# Patient Record
Sex: FEMALE | Race: WHITE | Employment: OTHER | URBAN - METROPOLITAN AREA
[De-identification: names, ages, dates, MRNs, and addresses within clinical notes are randomized per-mention and may not be internally consistent; named-entity substitution may affect disease eponyms.]

---

## 2017-01-12 ENCOUNTER — TRANSFERRED RECORDS (OUTPATIENT)
Dept: HEALTH INFORMATION MANAGEMENT | Facility: CLINIC | Age: 72
End: 2017-01-12

## 2017-01-12 DIAGNOSIS — M25.561 RIGHT KNEE PAIN: Primary | ICD-10-CM

## 2017-01-12 LAB
CRP SERPL-MCNC: <2.9 MG/L (ref 0–8)
ERYTHROCYTE [SEDIMENTATION RATE] IN BLOOD BY WESTERGREN METHOD: 6 MM/H (ref 0–30)

## 2017-01-12 PROCEDURE — 36415 COLL VENOUS BLD VENIPUNCTURE: CPT | Performed by: FAMILY MEDICINE

## 2017-01-12 PROCEDURE — 86140 C-REACTIVE PROTEIN: CPT | Performed by: FAMILY MEDICINE

## 2017-01-12 PROCEDURE — 85652 RBC SED RATE AUTOMATED: CPT | Performed by: FAMILY MEDICINE

## 2017-04-28 ENCOUNTER — HOSPITAL ENCOUNTER (OUTPATIENT)
Dept: NUCLEAR MEDICINE | Facility: CLINIC | Age: 72
Setting detail: NUCLEAR MEDICINE
Discharge: HOME | End: 2017-04-28
Attending: ORTHOPAEDIC SURGERY | Admitting: ORTHOPAEDIC SURGERY
Payer: OTHER MISCELLANEOUS

## 2017-04-28 ENCOUNTER — HOSPITAL ENCOUNTER (OUTPATIENT)
Dept: NUCLEAR MEDICINE | Facility: CLINIC | Age: 72
Setting detail: NUCLEAR MEDICINE
End: 2017-04-28
Attending: ORTHOPAEDIC SURGERY
Payer: OTHER MISCELLANEOUS

## 2017-04-28 DIAGNOSIS — M25.561 KNEE PAIN, RIGHT: ICD-10-CM

## 2017-04-28 PROCEDURE — 34300033 ZZH RX 343: Performed by: ORTHOPAEDIC SURGERY

## 2017-04-28 PROCEDURE — 78315 BONE IMAGING 3 PHASE: CPT

## 2017-04-28 PROCEDURE — A9561 TC99M OXIDRONATE: HCPCS | Performed by: ORTHOPAEDIC SURGERY

## 2017-04-28 RX ADMIN — Medication 26.5 MILLICURIE: at 08:04

## 2017-06-16 DIAGNOSIS — E11.9 TYPE 2 DIABETES MELLITUS WITHOUT COMPLICATION, UNSPECIFIED LONG TERM INSULIN USE STATUS: ICD-10-CM

## 2017-06-16 RX ORDER — METFORMIN HCL 500 MG
TABLET, EXTENDED RELEASE 24 HR ORAL
Qty: 60 TABLET | Refills: 0 | Status: SHIPPED | OUTPATIENT
Start: 2017-06-16 | End: 2017-07-30

## 2017-06-16 NOTE — LETTER
[facility]  [address]  [facility] [address]  Phone: [phone]    June 16, 2017    Frances Cates                                                                                                                 [address] [facility] [address]            Dear Ms. Loan,    We are concerned about your health care.  We recently provided you with a medication refill.  Many medications require routine follow-up with your Doctor.      At this time we ask that: You make an appointment at your clinic for routine labs for medication monitoring.     Per 12- Hemoglobin A1c result note:     Notes Recorded by Violet Beauchamp MD on 12/16/2016 at 2:56 PM  Notify patient her basic chemistries and kidney functions are fine, and her cholesterol levels are very good.  The blood sugar was high at 295 at this time, but we know her A1C had gone way up too. She is starting the Dumas Valentino diet and we doubled her metformin, so she will need to have us recheck the A1C in March and see if this is enough of a change or if we need to add a second medication.  I have renewed her blood pressure meds and placed a future order for the A1C.      Your prescription: Has been refilled for 1 month so you may have time for the above noted follow-up.      Thank you,      Violet Beauchamp MD/ [facility]

## 2017-07-30 DIAGNOSIS — E11.9 TYPE 2 DIABETES MELLITUS WITHOUT COMPLICATION, UNSPECIFIED LONG TERM INSULIN USE STATUS: ICD-10-CM

## 2017-07-31 RX ORDER — METFORMIN HCL 500 MG
1000 TABLET, EXTENDED RELEASE 24 HR ORAL 2 TIMES DAILY WITH MEALS
Qty: 120 TABLET | Refills: 0 | Status: SHIPPED | OUTPATIENT
Start: 2017-07-31

## 2017-08-08 ENCOUNTER — TELEPHONE (OUTPATIENT)
Dept: FAMILY MEDICINE | Facility: CLINIC | Age: 72
End: 2017-08-08

## 2017-08-08 NOTE — TELEPHONE ENCOUNTER
RN hypertension panel management  Offer free BP CK with the RN  Patient has switched to [facility] for a clinic  Umm GARCIA RN

## 2017-11-06 ENCOUNTER — TRANSFERRED RECORDS (OUTPATIENT)
Dept: HEALTH INFORMATION MANAGEMENT | Facility: CLINIC | Age: 72
End: 2017-11-06

## 2017-11-21 ENCOUNTER — ANESTHESIA EVENT (OUTPATIENT)
Dept: SURGERY | Facility: CLINIC | Age: 72
DRG: 468 | End: 2017-11-21
Payer: OTHER MISCELLANEOUS

## 2017-11-22 ENCOUNTER — APPOINTMENT (OUTPATIENT)
Dept: GENERAL RADIOLOGY | Facility: CLINIC | Age: 72
DRG: 468 | End: 2017-11-22
Attending: ORTHOPAEDIC SURGERY
Payer: OTHER MISCELLANEOUS

## 2017-11-22 ENCOUNTER — SURGERY (OUTPATIENT)
Age: 72
End: 2017-11-22

## 2017-11-22 ENCOUNTER — ANESTHESIA (OUTPATIENT)
Dept: SURGERY | Facility: CLINIC | Age: 72
DRG: 468 | End: 2017-11-22
Payer: OTHER MISCELLANEOUS

## 2017-11-22 ENCOUNTER — HOSPITAL ENCOUNTER (INPATIENT)
Facility: CLINIC | Age: 72
LOS: 2 days | Discharge: HOME | DRG: 468 | End: 2017-11-24
Attending: ORTHOPAEDIC SURGERY | Admitting: ORTHOPAEDIC SURGERY
Payer: OTHER MISCELLANEOUS

## 2017-11-22 DIAGNOSIS — B36.9 FUNGAL RASH OF TRUNK: ICD-10-CM

## 2017-11-22 DIAGNOSIS — Z96.651 STATUS POST TOTAL RIGHT KNEE REPLACEMENT: Primary | ICD-10-CM

## 2017-11-22 DIAGNOSIS — E11.9 TYPE II OR UNSPECIFIED TYPE DIABETES MELLITUS WITHOUT MENTION OF COMPLICATION, NOT STATED AS UNCONTROLLED: ICD-10-CM

## 2017-11-22 PROBLEM — Z96.659 S/P TOTAL KNEE ARTHROPLASTY: Status: ACTIVE | Noted: 2017-11-22

## 2017-11-22 LAB
GLUCOSE BLDC GLUCOMTR-MCNC: 171 MG/DL (ref 70–99)
GLUCOSE BLDC GLUCOMTR-MCNC: 211 MG/DL (ref 70–99)
GLUCOSE BLDC GLUCOMTR-MCNC: 219 MG/DL (ref 70–99)
GLUCOSE BLDC GLUCOMTR-MCNC: 225 MG/DL (ref 70–99)

## 2017-11-22 PROCEDURE — 25000128 H RX IP 250 OP 636: Performed by: NURSE ANESTHETIST, CERTIFIED REGISTERED

## 2017-11-22 PROCEDURE — 25000131 ZZH RX MED GY IP 250 OP 636 PS 637: Performed by: PHYSICIAN ASSISTANT

## 2017-11-22 PROCEDURE — 40000306 ZZH STATISTIC PRE PROC ASSESS II: Performed by: ORTHOPAEDIC SURGERY

## 2017-11-22 PROCEDURE — 37000009 ZZH ANESTHESIA TECHNICAL FEE, EACH ADDTL 15 MIN: Performed by: ORTHOPAEDIC SURGERY

## 2017-11-22 PROCEDURE — 25000125 ZZHC RX 250: Performed by: NURSE ANESTHETIST, CERTIFIED REGISTERED

## 2017-11-22 PROCEDURE — 36000067 ZZH SURGERY LEVEL 5 1ST 30 MIN: Performed by: ORTHOPAEDIC SURGERY

## 2017-11-22 PROCEDURE — 0SRC0JA REPLACEMENT OF RIGHT KNEE JOINT WITH SYNTHETIC SUBSTITUTE, UNCEMENTED, OPEN APPROACH: ICD-10-PCS | Performed by: ORTHOPAEDIC SURGERY

## 2017-11-22 PROCEDURE — 27210794 ZZH OR GENERAL SUPPLY STERILE: Performed by: ORTHOPAEDIC SURGERY

## 2017-11-22 PROCEDURE — 71000012 ZZH RECOVERY PHASE 1 LEVEL 1 FIRST HR: Performed by: ORTHOPAEDIC SURGERY

## 2017-11-22 PROCEDURE — 36000069 ZZH SURGERY LEVEL 5 EA 15 ADDTL MIN: Performed by: ORTHOPAEDIC SURGERY

## 2017-11-22 PROCEDURE — 71000013 ZZH RECOVERY PHASE 1 LEVEL 1 EA ADDTL HR: Performed by: ORTHOPAEDIC SURGERY

## 2017-11-22 PROCEDURE — 12000007 ZZH R&B INTERMEDIATE

## 2017-11-22 PROCEDURE — 25000125 ZZHC RX 250: Performed by: PHYSICIAN ASSISTANT

## 2017-11-22 PROCEDURE — 0SPC0JZ REMOVAL OF SYNTHETIC SUBSTITUTE FROM RIGHT KNEE JOINT, OPEN APPROACH: ICD-10-PCS | Performed by: ORTHOPAEDIC SURGERY

## 2017-11-22 PROCEDURE — 25800025 ZZH RX 258: Performed by: ORTHOPAEDIC SURGERY

## 2017-11-22 PROCEDURE — C1776 JOINT DEVICE (IMPLANTABLE): HCPCS | Performed by: ORTHOPAEDIC SURGERY

## 2017-11-22 PROCEDURE — 27810169 ZZH OR IMPLANT GENERAL: Performed by: ORTHOPAEDIC SURGERY

## 2017-11-22 PROCEDURE — 25000128 H RX IP 250 OP 636: Performed by: ORTHOPAEDIC SURGERY

## 2017-11-22 PROCEDURE — 25000125 ZZHC RX 250: Performed by: ORTHOPAEDIC SURGERY

## 2017-11-22 PROCEDURE — 27210995 ZZH RX 272: Performed by: ORTHOPAEDIC SURGERY

## 2017-11-22 PROCEDURE — 25000132 ZZH RX MED GY IP 250 OP 250 PS 637: Performed by: ORTHOPAEDIC SURGERY

## 2017-11-22 PROCEDURE — 37000008 ZZH ANESTHESIA TECHNICAL FEE, 1ST 30 MIN: Performed by: ORTHOPAEDIC SURGERY

## 2017-11-22 PROCEDURE — 25000128 H RX IP 250 OP 636: Performed by: PHYSICIAN ASSISTANT

## 2017-11-22 PROCEDURE — 40000986 XR KNEE PORT RT 1/2 VW: Mod: RT

## 2017-11-22 PROCEDURE — 0SBC0ZZ EXCISION OF RIGHT KNEE JOINT, OPEN APPROACH: ICD-10-PCS | Performed by: ORTHOPAEDIC SURGERY

## 2017-11-22 PROCEDURE — 00000146 ZZHCL STATISTIC GLUCOSE BY METER IP

## 2017-11-22 DEVICE — IMPLANTABLE DEVICE: Type: IMPLANTABLE DEVICE | Site: KNEE | Status: FUNCTIONAL

## 2017-11-22 DEVICE — BONE CEMENT PALACOS 00-1112-140-01: Type: IMPLANTABLE DEVICE | Site: KNEE | Status: FUNCTIONAL

## 2017-11-22 RX ORDER — HYDROCHLOROTHIAZIDE 12.5 MG/1
25 CAPSULE ORAL DAILY
Status: DISCONTINUED | OUTPATIENT
Start: 2017-11-23 | End: 2017-11-24 | Stop reason: HOSPADM

## 2017-11-22 RX ORDER — CEFAZOLIN SODIUM 2 G/100ML
2 INJECTION, SOLUTION INTRAVENOUS EVERY 8 HOURS
Status: COMPLETED | OUTPATIENT
Start: 2017-11-22 | End: 2017-11-23

## 2017-11-22 RX ORDER — FENTANYL CITRATE 50 UG/ML
25-50 INJECTION, SOLUTION INTRAMUSCULAR; INTRAVENOUS
Status: DISCONTINUED | OUTPATIENT
Start: 2017-11-22 | End: 2017-11-24 | Stop reason: HOSPADM

## 2017-11-22 RX ORDER — METOCLOPRAMIDE 5 MG/1
5 TABLET ORAL EVERY 6 HOURS PRN
Status: DISCONTINUED | OUTPATIENT
Start: 2017-11-22 | End: 2017-11-24 | Stop reason: HOSPADM

## 2017-11-22 RX ORDER — ONDANSETRON 2 MG/ML
INJECTION INTRAMUSCULAR; INTRAVENOUS PRN
Status: DISCONTINUED | OUTPATIENT
Start: 2017-11-22 | End: 2017-11-22

## 2017-11-22 RX ORDER — LIDOCAINE 40 MG/G
CREAM TOPICAL
Status: DISCONTINUED | OUTPATIENT
Start: 2017-11-22 | End: 2017-11-22 | Stop reason: HOSPADM

## 2017-11-22 RX ORDER — ALBUTEROL SULFATE 0.83 MG/ML
2.5 SOLUTION RESPIRATORY (INHALATION) EVERY 4 HOURS PRN
Status: DISCONTINUED | OUTPATIENT
Start: 2017-11-22 | End: 2017-11-22 | Stop reason: HOSPADM

## 2017-11-22 RX ORDER — LABETALOL HYDROCHLORIDE 5 MG/ML
20 INJECTION, SOLUTION INTRAVENOUS EVERY 6 HOURS PRN
Status: DISCONTINUED | OUTPATIENT
Start: 2017-11-22 | End: 2017-11-24 | Stop reason: HOSPADM

## 2017-11-22 RX ORDER — ONDANSETRON 2 MG/ML
4 INJECTION INTRAMUSCULAR; INTRAVENOUS EVERY 30 MIN PRN
Status: DISCONTINUED | OUTPATIENT
Start: 2017-11-22 | End: 2017-11-24 | Stop reason: HOSPADM

## 2017-11-22 RX ORDER — LIDOCAINE 40 MG/G
CREAM TOPICAL
Status: DISCONTINUED | OUTPATIENT
Start: 2017-11-22 | End: 2017-11-24 | Stop reason: HOSPADM

## 2017-11-22 RX ORDER — FENTANYL CITRATE 50 UG/ML
25-50 INJECTION, SOLUTION INTRAMUSCULAR; INTRAVENOUS
Status: DISCONTINUED | OUTPATIENT
Start: 2017-11-22 | End: 2017-11-22 | Stop reason: HOSPADM

## 2017-11-22 RX ORDER — ROPIVACAINE HYDROCHLORIDE 5 MG/ML
INJECTION, SOLUTION EPIDURAL; INFILTRATION; PERINEURAL PRN
Status: DISCONTINUED | OUTPATIENT
Start: 2017-11-22 | End: 2017-11-22

## 2017-11-22 RX ORDER — DEXAMETHASONE SODIUM PHOSPHATE 4 MG/ML
INJECTION, SOLUTION INTRA-ARTICULAR; INTRALESIONAL; INTRAMUSCULAR; INTRAVENOUS; SOFT TISSUE PRN
Status: DISCONTINUED | OUTPATIENT
Start: 2017-11-22 | End: 2017-11-22

## 2017-11-22 RX ORDER — SODIUM CHLORIDE, SODIUM LACTATE, POTASSIUM CHLORIDE, CALCIUM CHLORIDE 600; 310; 30; 20 MG/100ML; MG/100ML; MG/100ML; MG/100ML
INJECTION, SOLUTION INTRAVENOUS CONTINUOUS
Status: DISCONTINUED | OUTPATIENT
Start: 2017-11-22 | End: 2017-11-22

## 2017-11-22 RX ORDER — HYDROMORPHONE HYDROCHLORIDE 1 MG/ML
.3-.5 INJECTION, SOLUTION INTRAMUSCULAR; INTRAVENOUS; SUBCUTANEOUS EVERY 10 MIN PRN
Status: DISCONTINUED | OUTPATIENT
Start: 2017-11-22 | End: 2017-11-22

## 2017-11-22 RX ORDER — ONDANSETRON 4 MG/1
4 TABLET, ORALLY DISINTEGRATING ORAL EVERY 30 MIN PRN
Status: DISCONTINUED | OUTPATIENT
Start: 2017-11-22 | End: 2017-11-24 | Stop reason: HOSPADM

## 2017-11-22 RX ORDER — HYDROXYZINE HYDROCHLORIDE 25 MG/1
25 TABLET, FILM COATED ORAL EVERY 6 HOURS PRN
Status: DISCONTINUED | OUTPATIENT
Start: 2017-11-22 | End: 2017-11-24 | Stop reason: HOSPADM

## 2017-11-22 RX ORDER — OXYCODONE HYDROCHLORIDE 5 MG/1
5-10 TABLET ORAL
Status: DISCONTINUED | OUTPATIENT
Start: 2017-11-22 | End: 2017-11-24 | Stop reason: HOSPADM

## 2017-11-22 RX ORDER — SODIUM CHLORIDE, SODIUM LACTATE, POTASSIUM CHLORIDE, CALCIUM CHLORIDE 600; 310; 30; 20 MG/100ML; MG/100ML; MG/100ML; MG/100ML
INJECTION, SOLUTION INTRAVENOUS CONTINUOUS
Status: DISCONTINUED | OUTPATIENT
Start: 2017-11-22 | End: 2017-11-22 | Stop reason: HOSPADM

## 2017-11-22 RX ORDER — PROPOFOL 10 MG/ML
INJECTION, EMULSION INTRAVENOUS CONTINUOUS PRN
Status: DISCONTINUED | OUTPATIENT
Start: 2017-11-22 | End: 2017-11-22

## 2017-11-22 RX ORDER — LOSARTAN POTASSIUM AND HYDROCHLOROTHIAZIDE 25; 100 MG/1; MG/1
1 TABLET ORAL DAILY
Status: DISCONTINUED | OUTPATIENT
Start: 2017-11-22 | End: 2017-11-22 | Stop reason: CLARIF

## 2017-11-22 RX ORDER — LOSARTAN POTASSIUM AND HYDROCHLOROTHIAZIDE 25; 100 MG/1; MG/1
1 TABLET ORAL DAILY
Status: DISCONTINUED | OUTPATIENT
Start: 2017-11-23 | End: 2017-11-22

## 2017-11-22 RX ORDER — GLIPIZIDE 10 MG/1
10 TABLET ORAL
Status: DISCONTINUED | OUTPATIENT
Start: 2017-11-24 | End: 2017-11-22

## 2017-11-22 RX ORDER — LIDOCAINE HYDROCHLORIDE 10 MG/ML
INJECTION, SOLUTION EPIDURAL; INFILTRATION; INTRACAUDAL; PERINEURAL PRN
Status: DISCONTINUED | OUTPATIENT
Start: 2017-11-22 | End: 2017-11-22

## 2017-11-22 RX ORDER — GLIPIZIDE 5 MG/1
5 TABLET ORAL
Status: DISCONTINUED | OUTPATIENT
Start: 2017-11-24 | End: 2017-11-24 | Stop reason: HOSPADM

## 2017-11-22 RX ORDER — HYDROMORPHONE HYDROCHLORIDE 1 MG/ML
.3-.5 INJECTION, SOLUTION INTRAMUSCULAR; INTRAVENOUS; SUBCUTANEOUS
Status: DISCONTINUED | OUTPATIENT
Start: 2017-11-22 | End: 2017-11-24 | Stop reason: HOSPADM

## 2017-11-22 RX ORDER — METFORMIN HCL 500 MG
1000 TABLET, EXTENDED RELEASE 24 HR ORAL 2 TIMES DAILY WITH MEALS
Status: DISCONTINUED | OUTPATIENT
Start: 2017-11-24 | End: 2017-11-24 | Stop reason: HOSPADM

## 2017-11-22 RX ORDER — BUPIVACAINE HYDROCHLORIDE 7.5 MG/ML
INJECTION, SOLUTION INTRASPINAL PRN
Status: DISCONTINUED | OUTPATIENT
Start: 2017-11-22 | End: 2017-11-22

## 2017-11-22 RX ORDER — EPINEPHRINE 1 MG/ML
INJECTION, SOLUTION, CONCENTRATE INTRAVENOUS PRN
Status: DISCONTINUED | OUTPATIENT
Start: 2017-11-22 | End: 2017-11-22

## 2017-11-22 RX ORDER — MEPERIDINE HYDROCHLORIDE 25 MG/ML
12.5 INJECTION INTRAMUSCULAR; INTRAVENOUS; SUBCUTANEOUS
Status: DISCONTINUED | OUTPATIENT
Start: 2017-11-22 | End: 2017-11-22

## 2017-11-22 RX ORDER — CEFAZOLIN SODIUM 2 G/100ML
2 INJECTION, SOLUTION INTRAVENOUS
Status: COMPLETED | OUTPATIENT
Start: 2017-11-22 | End: 2017-11-22

## 2017-11-22 RX ORDER — LOSARTAN POTASSIUM 50 MG/1
100 TABLET ORAL DAILY
Status: DISCONTINUED | OUTPATIENT
Start: 2017-11-23 | End: 2017-11-24 | Stop reason: HOSPADM

## 2017-11-22 RX ORDER — ONDANSETRON 4 MG/1
4 TABLET, ORALLY DISINTEGRATING ORAL EVERY 6 HOURS PRN
Status: DISCONTINUED | OUTPATIENT
Start: 2017-11-22 | End: 2017-11-24 | Stop reason: HOSPADM

## 2017-11-22 RX ORDER — DEXAMETHASONE SODIUM PHOSPHATE 10 MG/ML
10 INJECTION, SOLUTION INTRAMUSCULAR; INTRAVENOUS
Status: DISCONTINUED | OUTPATIENT
Start: 2017-11-22 | End: 2017-11-22 | Stop reason: HOSPADM

## 2017-11-22 RX ORDER — ONDANSETRON 2 MG/ML
4 INJECTION INTRAMUSCULAR; INTRAVENOUS EVERY 6 HOURS PRN
Status: DISCONTINUED | OUTPATIENT
Start: 2017-11-22 | End: 2017-11-24 | Stop reason: HOSPADM

## 2017-11-22 RX ORDER — METOCLOPRAMIDE HYDROCHLORIDE 5 MG/ML
5 INJECTION INTRAMUSCULAR; INTRAVENOUS EVERY 6 HOURS PRN
Status: DISCONTINUED | OUTPATIENT
Start: 2017-11-22 | End: 2017-11-24 | Stop reason: HOSPADM

## 2017-11-22 RX ORDER — FENTANYL CITRATE 50 UG/ML
INJECTION, SOLUTION INTRAMUSCULAR; INTRAVENOUS PRN
Status: DISCONTINUED | OUTPATIENT
Start: 2017-11-22 | End: 2017-11-22

## 2017-11-22 RX ORDER — AMLODIPINE BESYLATE 10 MG/1
10 TABLET ORAL DAILY
Status: DISCONTINUED | OUTPATIENT
Start: 2017-11-23 | End: 2017-11-24 | Stop reason: HOSPADM

## 2017-11-22 RX ORDER — CYCLOBENZAPRINE HCL 5 MG
5 TABLET ORAL 3 TIMES DAILY PRN
Status: DISCONTINUED | OUTPATIENT
Start: 2017-11-22 | End: 2017-11-24 | Stop reason: HOSPADM

## 2017-11-22 RX ORDER — NALOXONE HYDROCHLORIDE 0.4 MG/ML
.1-.4 INJECTION, SOLUTION INTRAMUSCULAR; INTRAVENOUS; SUBCUTANEOUS
Status: DISCONTINUED | OUTPATIENT
Start: 2017-11-22 | End: 2017-11-24 | Stop reason: HOSPADM

## 2017-11-22 RX ORDER — ACETAMINOPHEN 325 MG/1
650 TABLET ORAL EVERY 4 HOURS PRN
Status: DISCONTINUED | OUTPATIENT
Start: 2017-11-25 | End: 2017-11-24 | Stop reason: HOSPADM

## 2017-11-22 RX ORDER — LIDOCAINE HYDROCHLORIDE 10 MG/ML
INJECTION, SOLUTION INFILTRATION; PERINEURAL PRN
Status: DISCONTINUED | OUTPATIENT
Start: 2017-11-22 | End: 2017-11-22

## 2017-11-22 RX ORDER — DEXTROSE MONOHYDRATE 25 G/50ML
25-50 INJECTION, SOLUTION INTRAVENOUS
Status: DISCONTINUED | OUTPATIENT
Start: 2017-11-22 | End: 2017-11-24 | Stop reason: HOSPADM

## 2017-11-22 RX ORDER — AMOXICILLIN 250 MG
1-2 CAPSULE ORAL 2 TIMES DAILY
Status: DISCONTINUED | OUTPATIENT
Start: 2017-11-22 | End: 2017-11-24 | Stop reason: HOSPADM

## 2017-11-22 RX ORDER — NICOTINE POLACRILEX 4 MG
15-30 LOZENGE BUCCAL
Status: DISCONTINUED | OUTPATIENT
Start: 2017-11-22 | End: 2017-11-24 | Stop reason: HOSPADM

## 2017-11-22 RX ORDER — ACETAMINOPHEN 325 MG/1
975 TABLET ORAL EVERY 8 HOURS
Status: DISCONTINUED | OUTPATIENT
Start: 2017-11-22 | End: 2017-11-24 | Stop reason: HOSPADM

## 2017-11-22 RX ORDER — NALOXONE HYDROCHLORIDE 0.4 MG/ML
.1-.4 INJECTION, SOLUTION INTRAMUSCULAR; INTRAVENOUS; SUBCUTANEOUS
Status: ACTIVE | OUTPATIENT
Start: 2017-11-22 | End: 2017-11-23

## 2017-11-22 RX ORDER — SODIUM CHLORIDE, SODIUM LACTATE, POTASSIUM CHLORIDE, CALCIUM CHLORIDE 600; 310; 30; 20 MG/100ML; MG/100ML; MG/100ML; MG/100ML
INJECTION, SOLUTION INTRAVENOUS CONTINUOUS
Status: DISCONTINUED | OUTPATIENT
Start: 2017-11-22 | End: 2017-11-23

## 2017-11-22 RX ADMIN — DEXAMETHASONE SODIUM PHOSPHATE 10 MG: 4 INJECTION, SOLUTION INTRA-ARTICULAR; INTRALESIONAL; INTRAMUSCULAR; INTRAVENOUS; SOFT TISSUE at 09:53

## 2017-11-22 RX ADMIN — ACETAMINOPHEN 975 MG: 325 TABLET, FILM COATED ORAL at 22:30

## 2017-11-22 RX ADMIN — TRANEXAMIC ACID 1 G: 100 INJECTION, SOLUTION INTRAVENOUS at 09:56

## 2017-11-22 RX ADMIN — ROPIVACAINE HYDROCHLORIDE 20 ML: 5 INJECTION, SOLUTION EPIDURAL; INFILTRATION; PERINEURAL at 13:27

## 2017-11-22 RX ADMIN — CYCLOBENZAPRINE 5 MG: 5 TABLET, FILM COATED ORAL at 17:54

## 2017-11-22 RX ADMIN — EPINEPHRINE 0.2 MG: 1 INJECTION, SOLUTION INTRAMUSCULAR; SUBCUTANEOUS at 09:48

## 2017-11-22 RX ADMIN — BUPIVACAINE HYDROCHLORIDE IN DEXTROSE 1.6 ML: 7.5 INJECTION, SOLUTION SUBARACHNOID at 09:48

## 2017-11-22 RX ADMIN — MIDAZOLAM HYDROCHLORIDE 2 MG: 1 INJECTION, SOLUTION INTRAMUSCULAR; INTRAVENOUS at 12:09

## 2017-11-22 RX ADMIN — SODIUM CHLORIDE, POTASSIUM CHLORIDE, SODIUM LACTATE AND CALCIUM CHLORIDE: 600; 310; 30; 20 INJECTION, SOLUTION INTRAVENOUS at 23:56

## 2017-11-22 RX ADMIN — OXYCODONE HYDROCHLORIDE 5 MG: 5 TABLET ORAL at 17:54

## 2017-11-22 RX ADMIN — LIDOCAINE HYDROCHLORIDE 3 ML: 10 INJECTION, SOLUTION EPIDURAL; INFILTRATION; INTRACAUDAL; PERINEURAL at 13:25

## 2017-11-22 RX ADMIN — SODIUM CHLORIDE, POTASSIUM CHLORIDE, SODIUM LACTATE AND CALCIUM CHLORIDE: 600; 310; 30; 20 INJECTION, SOLUTION INTRAVENOUS at 08:37

## 2017-11-22 RX ADMIN — FENTANYL CITRATE 50 MCG: 50 INJECTION INTRAMUSCULAR; INTRAVENOUS at 13:25

## 2017-11-22 RX ADMIN — FENTANYL CITRATE 50 MCG: 50 INJECTION, SOLUTION INTRAMUSCULAR; INTRAVENOUS at 09:39

## 2017-11-22 RX ADMIN — OXYCODONE HYDROCHLORIDE 5 MG: 5 TABLET ORAL at 14:56

## 2017-11-22 RX ADMIN — CEFAZOLIN SODIUM 2 G: 2 INJECTION, SOLUTION INTRAVENOUS at 17:54

## 2017-11-22 RX ADMIN — MIDAZOLAM HYDROCHLORIDE 1 MG: 1 INJECTION, SOLUTION INTRAMUSCULAR; INTRAVENOUS at 09:39

## 2017-11-22 RX ADMIN — FENTANYL CITRATE 50 MCG: 50 INJECTION INTRAMUSCULAR; INTRAVENOUS at 13:33

## 2017-11-22 RX ADMIN — MIDAZOLAM HYDROCHLORIDE 1 MG: 1 INJECTION, SOLUTION INTRAMUSCULAR; INTRAVENOUS at 09:42

## 2017-11-22 RX ADMIN — OXYCODONE HYDROCHLORIDE 10 MG: 5 TABLET ORAL at 20:46

## 2017-11-22 RX ADMIN — LIDOCAINE HYDROCHLORIDE 5 ML: 10 INJECTION, SOLUTION INFILTRATION; PERINEURAL at 09:53

## 2017-11-22 RX ADMIN — HYDROMORPHONE HYDROCHLORIDE 1 MG: 1 INJECTION, SOLUTION INTRAMUSCULAR; INTRAVENOUS; SUBCUTANEOUS at 12:42

## 2017-11-22 RX ADMIN — LIDOCAINE HYDROCHLORIDE 1 ML: 10 INJECTION, SOLUTION EPIDURAL; INFILTRATION; INTRACAUDAL; PERINEURAL at 08:37

## 2017-11-22 RX ADMIN — ACETAMINOPHEN 975 MG: 325 TABLET, FILM COATED ORAL at 14:56

## 2017-11-22 RX ADMIN — SODIUM CHLORIDE, POTASSIUM CHLORIDE, SODIUM LACTATE AND CALCIUM CHLORIDE: 600; 310; 30; 20 INJECTION, SOLUTION INTRAVENOUS at 10:30

## 2017-11-22 RX ADMIN — CEFAZOLIN SODIUM 2 G: 2 INJECTION, SOLUTION INTRAVENOUS at 09:39

## 2017-11-22 RX ADMIN — INSULIN ASPART 2 UNITS: 100 INJECTION, SOLUTION INTRAVENOUS; SUBCUTANEOUS at 17:54

## 2017-11-22 RX ADMIN — SODIUM CHLORIDE 400 ML: 900 IRRIGANT IRRIGATION at 12:04

## 2017-11-22 RX ADMIN — ONDANSETRON 4 MG: 2 INJECTION INTRAMUSCULAR; INTRAVENOUS at 09:53

## 2017-11-22 RX ADMIN — FENTANYL CITRATE 50 MCG: 50 INJECTION INTRAMUSCULAR; INTRAVENOUS at 13:05

## 2017-11-22 RX ADMIN — PROPOFOL 50 MCG/KG/MIN: 10 INJECTION, EMULSION INTRAVENOUS at 09:53

## 2017-11-22 RX ADMIN — SENNOSIDES AND DOCUSATE SODIUM 1 TABLET: 8.6; 5 TABLET ORAL at 20:46

## 2017-11-22 RX ADMIN — TRANEXAMIC ACID 1 G: 100 INJECTION, SOLUTION INTRAVENOUS at 12:00

## 2017-11-22 RX ADMIN — FENTANYL CITRATE 50 MCG: 50 INJECTION INTRAMUSCULAR; INTRAVENOUS at 13:08

## 2017-11-22 RX ADMIN — FENTANYL CITRATE 50 MCG: 50 INJECTION, SOLUTION INTRAMUSCULAR; INTRAVENOUS at 09:50

## 2017-11-22 RX ADMIN — POVIDONE-IODINE 100 ML GIVEN: 10 SOLUTION TOPICAL at 12:04

## 2017-11-22 NOTE — OR NURSING
Reddened area  along skin folds noted on low abd. No area of skin breakdown noted. Info,.given to Laura MOON, along with report.

## 2017-11-22 NOTE — BRIEF OP NOTE
Brief Ortho Op Note    Preop Diagnosis:   1. Aseptic loosening right TKA with osteolysis  Post Op Diagnosis: Same  Procedure:   1. Revision R TKA  Surgeon: Scottie  Assistant: Jose Guadalupe  Anesthesia: Spinal  EBL: 50cc  Implants: [employer] revision with 75mm x 14mm tibial stem with 37mm metaphyseal sleeve and 2.5mm revision MBT tray, 12.5mm x 3mm RP polyethylene, 75mmx 14mm femoral stem with 31mm metaphyseal sleeve, size 3 femoral component and 8mm distal lateral augment  Tourniquet Time: 108 minutes right LE  Drains: None  Complications: None apparent  Specimens: None  Findings: Per dictation.  Femur ingrowth and not grossly loose but lysis over lateral femoral condyle and less mediall.  Tibia with significant lysis over medial plateau.    Post Op Plan:   --WBAT RLE.  PT for mobilization   --DVT prophylaxis: ASA 325mg daily   --Perioperative antibiotics   --Follow up: 2 weeks for wound check    Hal Rubin MD

## 2017-11-22 NOTE — ANESTHESIA CARE TRANSFER NOTE
Patient: Frances Cates    Procedure(s):  Revision Right Total Knee Arthroplasty - Wound Class: I-Clean    Diagnosis: aseptic loosening  Diagnosis Additional Information: No value filed.    Anesthesia Type:   Spinal     Note:  Airway :Nasal Cannula  Patient transferred to:Phase II  Handoff Report: Identifed the Patient, Identified the Reponsible Provider, Reviewed the pertinent medical history, Discussed the surgical course, Reviewed Intra-OP anesthesia mangement and issues during anesthesia, Set expectations for post-procedure period and Allowed opportunity for questions and acknowledgement of understanding      Vitals: (Last set prior to Anesthesia Care Transfer)    CRNA VITALS  11/22/2017 1214 - 11/22/2017 1249      11/22/2017             Pulse: 64    SpO2: 91 %                Electronically Signed By: Macario Schmitz CRNA, LANETTE XIONG  November 22, 2017  12:49 PM

## 2017-11-22 NOTE — IP AVS SNAPSHOT
MRN:[id number]                      After Visit Summary   11/22/2017    Frances Cates    MRN: [id number]           Thank you!     Thank you for choosing [facility] for your care. Our goal is always to provide you with excellent care. Hearing back from our patients is one way we can continue to improve our services. Please take a few minutes to complete the written survey that you may receive in the mail after you visit with us. Thank you!        Patient Information     Date Of Birth          1945        Designated Caregiver       Most Recent Value    Caregiver    Will someone help with your care after discharge? no      About your hospital stay     You were admitted on:  November 22, 2017 You last received care in the:  [facility]    You were discharged on:  November 24, 2017        Reason for your hospital stay       Right total knee arthroplasty revision                  Who to Call     For medical emergencies, please call 911.  For non-urgent questions about your medical care, please call your primary care provider or clinic, [phone]  For questions related to your surgery, please call your surgery clinic        Attending Provider     Provider Specialty    Hal Rubin MD Orthopedics       Primary Care Provider Office Phone # Fax #    Megan Atkinson [phone]       When to contact your care team       Call [facility] if you have any of the following:  increased shortness of breath, increased drainage, increased swelling or increased pain.                  After Care Instructions     Activity       Your activity upon discharge: activity as tolerated            Diet       Follow this diet upon discharge: Orders Placed This Encounter      Moderate Consistent CHO Diet            Wound care and dressings       Instructions to care for your wound at home: ice to area for comfort, keep wound clean and dry and may get incision wet in shower but  do not soak or scrub.                  Follow-up Appointments     Follow-up and recommended labs and tests        Follow up with [facility] in 10-14 days                  Additional Services     HOME CARE NURSING REFERRAL       **Order classes of: [facility], [facility] and [facility] will route to the Home Care and Hospice Referral Pool.  Home Care or Hospice will then contact the patient to schedule their appointment.**    If you do not hear from Home Care and Hospice, or you would like to call to schedule, please call the referring place of service that your provider has listed beFMG: [facility] [phone]   [email or web address].  ______________________________________________________________________    Your provider has referred you to:     Extended Emergency Contact Information  Primary Emergency Contact: [place] [address] [facility]  Home Phone: [phone]  Work Phone: [phone]  Mobile Phone: [phone]  Relation: Daughter    Patient Anticipated Discharge Date: 11/24   RN, PT, HHA to begin 24 - 48 hours after discharge.  PLEASE EVALUATE AND TREAT (Evaluation timeline is 24 - 48 hrs. Please call if there is need for a variance to this timeline).    REASON FOR REFERRAL: Assessment & Treatment: PT    ADDITIONAL SERVICES NEEDED: HHA    OTHER PERTINENT INFORMATION: Patient was last seen by provider on 11/23 for discharge.    Current Outpatient Prescriptions:  oxyCODONE IR (ROXICODONE) 5 MG tablet, Take 1-2 tablets (5-10 mg) by mouth every 3 hours as needed for moderate to severe pain, Disp: 90 tablet, Rfl: 0  [START ON 11/25/2017] acetaminophen (TYLENOL) 325 MG tablet, Take 2 tablets (650 mg) by mouth every 4 hours as needed for other (surgical pain), Disp: 100 tablet, Rfl: 0  aspirin  MG EC tablet, Take 1 tablet (325 mg) by mouth daily, Disp: 42 tablet, Rfl: 0  hydrOXYzine (ATARAX)  25 MG tablet, Take 1 tablet (25 mg) by mouth every 6 hours as needed for itching, Disp: 120 tablet, Rfl: 0  order for DME, Equipment being ordered: CPM, Disp: 1 Device, Rfl: 0      Patient Active Problem List:     Essential hypertension, benign     Allergic rhinitis     Chronic gingivitis     JOINT REPLACEMENT KNEE     Generalized osteoarthrosis, unspecified site     Diverticulosis of colon     Type 2 diabetes mellitus without complication (H)     Advanced directives, counseling/discussionFULL CODE 06/02/11      Vitamin D deficiency     Intermittent asthma     Atypical chest pain     Status post right knee replacement     S/P total knee arthroplasty      Documentation of Face to Face and Certification for Home Health Services    I certify that patient, Frances Cates is under my care and that I, or a Nurse Practitioner or Physician's Assistant working with me, had a face-to-face encounter that meets the physician face-to-face encounter requirements with this patient on: 11/25.    This encounter with the patient was in whole, or in part, for the following medical condition, which is the primary reason for Home Health Care: pt rn.    I certify that, based on my findings, the following services are medically necessary Home Health Services: Physical Therapy    My clinical findings support the need for the above services because: Physical Therapy Services are needed to assess and treat the following functional impairments: mobility.    Further, I certify that my clinical findings support that this patient is homebound (i.e. absences from home require considerable and taxing effort and are for medical reasons or [employer] services or infrequently or of short duration when for other reasons) because: Requires assistance of another person or specialized equipment to access medical services because patient: Has prohibitive pain during ambulation..    Based on the above findings, I certify that this patient is confined to  the home and needs intermittent skilled nursing care, physical therapy and/or speech therapy.  The patient is under my care, and I have initiated the establishment of the plan of care.  This patient will be followed by a physician who will periodically review the plan of care.    Physician/Provider to provide follow up care: Megan Atkinson    Responsible KELLY certified Physician at time of discharge: 1400    Please be aware that coverage of these services is subject to the terms and limitations of your health insurance plan.  Call member services at your health plan with any benefit or coverage questions.            Home Care PT Referral for Hospital Discharge       PT to eval and treat    Your provider has ordered home care - physical therapy. If you have not been contacted within 2 days of your discharge please call the department phone number listed on the top of this document.                  Future tests that were ordered for you     CPM (Equipment)                 Further instructions from your care team       Oxycodone for moderate to severe pain  Tylenol for mild to moderate pain  Vistaril for muscle spasm, itching and adjunctive pain control  Start Aspirin 325mg for 42 days. After this is completed, please resume your normal home dose of 81mg Aspirin.  Keep icing knee  CPM to work on ROM    Home care Physical therapy ordered    Pending Results     No orders found from 11/20/2017 to 11/23/2017.            Statement of Approval     Ordered          11/23/17 0926  I have reviewed and agree with all the recommendations and orders detailed in this document.  EFFECTIVE NOW     Approved and electronically signed by:  Darline Durán PA-C             Admission Information     Date & Time Provider Department Dept. Phone    11/22/2017 Hal Rubin MD [facility] [phone]      Your Vitals Were     Blood Pressure Pulse Temperature Respirations Height Weight    125/65 (BP  "Location: Right arm) 52 97.4  F (36.3  C) (Oral) 16 1.6 m (5' 3\") 87.2 kg (192 lb 3.9 oz)    Pulse Oximetry BMI (Body Mass Index)                96% 34.05 kg/m2          [employer] Information     [employer] lets you send messages to your doctor, view your test results, renew your prescriptions, schedule appointments and more. To sign up, go to [email or web address] . Click on \"Log in\" on the left side of the screen, which will take you to the Welcome page. Then click on \"Sign up Now\" on the right side of the page.     You will be asked to enter the access code listed below, as well as some personal information. Please follow the directions to create your username and password.     Your access code is: [email or web address]  Expires: 2018  9:32 AM     Your access code will  in 90 days. If you need help or a new code, please call your [place] clinic or [phone].        Care EveryWhere ID     This is your Care EveryWhere ID. This could be used by other organizations to access your [facility] medical records  [id number]        Equal Access to Services     ELLIOT OLMSTEAD AH: Edgar Epstein, ines yu, chelsea nicole, jimmy peters. So [facility] [phone].    ATENCIÓN: Si habla español, tiene a clancy disposición servicios gratuitos de asistencia lingüística. Llame al [phone].    We comply with applicable federal civil rights laws and [place] laws. We do not discriminate on the basis of race, color, national origin, age, disability, sex, sexual orientation, or gender identity.               Review of your medicines      START taking        Dose / Directions    acetaminophen 325 MG tablet   Commonly known as:  TYLENOL   Notes to Patient:  Do Not Exceed 4000 mg in 24 hours        Dose:  650 mg   Start taking on:  2017   Take 2 tablets (650 mg) by mouth every 4 hours as needed for other (surgical pain)   Quantity:  100 tablet   Refills:  0       aspirin 325 " MG EC tablet   Replaces:  aspirin 81 MG tablet        Dose:  325 mg   Take 1 tablet (325 mg) by mouth daily   Quantity:  42 tablet   Refills:  0       hydrOXYzine 25 MG tablet   Commonly known as:  ATARAX   Notes to Patient:  Helps pain, works as muscle relaxer, helps itch        Dose:  25 mg   Take 1 tablet (25 mg) by mouth every 6 hours as needed for itching   Quantity:  120 tablet   Refills:  0       miconazole 2 % powder   Commonly known as:  MICATIN; MICRO GUARD   Used for:  Fungal rash of trunk        Apply topically 2 times daily Apply to affected area on abdomen twice a day as needed for itching and irritation.   Refills:  0       * order for DME        Equipment being ordered: CPM   Quantity:  1 Device   Refills:  0       * order for DME        Equipment being ordered: cane single prong   Quantity:  1 Device   Refills:  0       oxyCODONE IR 5 MG tablet   Commonly known as:  ROXICODONE        Dose:  5-10 mg   Take 1-2 tablets (5-10 mg) by mouth every 3 hours as needed for moderate to severe pain   Quantity:  90 tablet   Refills:  0       * Notice:  This list has 2 medication(s) that are the same as other medications prescribed for you. Read the directions carefully, and ask your doctor or other care provider to review them with you.      CONTINUE these medicines which have NOT CHANGED        Dose / Directions    amLODIPine 10 MG tablet   Commonly known as:  NORVASC   Used for:  Essential hypertension, benign        Dose:  10 mg   Take 1 tablet (10 mg) by mouth daily   Quantity:  90 tablet   Refills:  3       blood glucose monitoring test strip   Commonly known as:  ACCU-CHEK SMARTVIEW   Used for:  Type 2 diabetes mellitus without complication (H)        Use to test blood sugar 2 times daily or as directed.   Quantity:  100 each   Refills:  5       dicyclomine 10 MG capsule   Commonly known as:  BENTYL   Used for:  Abdominal cramps   Notes to Patient:  Not given, resume if needed for irritable bowel symptoms         Dose:  10-20 mg   Take 1-2 capsules (10-20 mg) by mouth 4 times daily as needed   Quantity:  60 capsule   Refills:  0       GLIPIZIDE PO   Notes to Patient:  Not given, resume        Dose:  5 mg   Take 5 mg by mouth 2 times daily (before meals)   Refills:  0       ibuprofen 200 MG tablet   Commonly known as:  ADVIL/MOTRIN   Used for:  S/P D&C (status post dilation and curettage)   Notes to Patient:  Not given, you may resume as needed        Dose:  200-400 mg   Take 1-2 tablets by mouth every 6 hours as needed for other (mild pain).   Quantity:  100 tablet   Refills:  0       losartan-hydrochlorothiazide 100-25 MG per tablet   Commonly known as:  HYZAAR   Used for:  Essential hypertension, benign        Dose:  1 tablet   Take 1 tablet by mouth daily   Quantity:  90 tablet   Refills:  3       metFORMIN 500 MG 24 hr tablet   Commonly known as:  GLUCOPHAGE-XR   Used for:  Type 2 diabetes mellitus without complication, unspecified long term insulin use status (H)        Dose:  1000 mg   Take 2 tablets (1,000 mg) by mouth 2 times daily (with meals) Labs DUE August 2017   Quantity:  120 tablet   Refills:  0       STATIN NOT PRESCRIBED (INTENTIONAL)        Refills:  0       * thin lancets   Commonly known as:  NO BRAND SPECIFIED   Used for:  Type 2 diabetes, HbA1C goal < 8% (H)        Dose:  1 Device   1 Device 4 times daily ,brand per patient insurance insurance coverage.   Quantity:  300 each   Refills:  0       * blood glucose monitoring lancets   Used for:  Type 2 diabetes, HbA1C goal < 8% (H)        Use to test blood sugar 2 times daily or as directed.   Quantity:  1 Box   Refills:  11       * Notice:  This list has 2 medication(s) that are the same as other medications prescribed for you. Read the directions carefully, and ask your doctor or other care provider to review them with you.      STOP taking     aspirin 81 MG tablet   Replaced by:  aspirin 325 MG EC tablet                Where to get your medicines       Some of these will need a paper prescription and others can be bought over the counter. Ask your nurse if you have questions.     Bring a paper prescription for each of these medications     acetaminophen 325 MG tablet    aspirin 325 MG EC tablet    hydrOXYzine 25 MG tablet    order for DME    order for DME    oxyCODONE IR 5 MG tablet       You don't need a prescription for these medications     miconazole 2 % powder                Protect others around you: Learn how to safely use, store and throw away your medicines at www.disposemymeds.org.             Medication List: This is a list of all your medications and when to take them. Check marks below indicate your daily home schedule. Keep this list as a reference.      Medications           Morning Afternoon Evening Bedtime As Needed    acetaminophen 325 MG tablet   Commonly known as:  TYLENOL   Take 2 tablets (650 mg) by mouth every 4 hours as needed for other (surgical pain)   Start taking on:  11/25/2017   Last time this was given:  975 mg on 11/24/2017  6:33 AM   Notes to Patient:  Do Not Exceed 4000 mg in 24 hours                                   amLODIPine 10 MG tablet   Commonly known as:  NORVASC   Take 1 tablet (10 mg) by mouth daily   Last time this was given:  10 mg on 11/24/2017  7:45 AM   Next Dose Due:  11/25/17                                   aspirin 325 MG EC tablet   Take 1 tablet (325 mg) by mouth daily   Last time this was given:  325 mg on 11/24/2017  7:45 AM   Next Dose Due:  11/25/17                                   blood glucose monitoring test strip   Commonly known as:  ACCU-CHEK SMARTVIEW   Use to test blood sugar 2 times daily or as directed.                                dicyclomine 10 MG capsule   Commonly known as:  BENTYL   Take 1-2 capsules (10-20 mg) by mouth 4 times daily as needed   Notes to Patient:  Not given, resume if needed for irritable bowel symptoms                                   GLIPIZIDE PO   Take 5 mg by  mouth 2 times daily (before meals)   Last time this was given:  5 mg on 11/24/2017  6:31 AM   Next Dose Due:  11/24/17   Notes to Patient:  Not given, resume                                      hydrOXYzine 25 MG tablet   Commonly known as:  ATARAX   Take 1 tablet (25 mg) by mouth every 6 hours as needed for itching   Last time this was given:  25 mg on 11/24/2017  6:31 AM   Notes to Patient:  Helps pain, works as muscle relaxer, helps itch                                   ibuprofen 200 MG tablet   Commonly known as:  ADVIL/MOTRIN   Take 1-2 tablets by mouth every 6 hours as needed for other (mild pain).   Notes to Patient:  Not given, you may resume as needed                                   losartan-hydrochlorothiazide 100-25 MG per tablet   Commonly known as:  HYZAAR   Take 1 tablet by mouth daily   Last time this was given:  11/24/17  7:45 AM   Next Dose Due:  11/25/17                                   metFORMIN 500 MG 24 hr tablet   Commonly known as:  GLUCOPHAGE-XR   Take 2 tablets (1,000 mg) by mouth 2 times daily (with meals) Labs DUE August 2017   Last time this was given:  1,000 mg on 11/24/2017  7:45 AM   Next Dose Due:  11/24/17                                      miconazole 2 % powder   Commonly known as:  MICATIN; MICRO GUARD   Apply topically 2 times daily Apply to affected area on abdomen twice a day as needed for itching and irritation.   Last time this was given:  11/24/2017  7:49 AM                                   * order for DME   Equipment being ordered: CPM                                * order for DME   Equipment being ordered: cane single prong                                oxyCODONE IR 5 MG tablet   Commonly known as:  ROXICODONE   Take 1-2 tablets (5-10 mg) by mouth every 3 hours as needed for moderate to severe pain   Last time this was given:  10 mg on 11/24/2017 11:03 AM                                   STATIN NOT PRESCRIBED (INTENTIONAL)                                * thin  lancets   Commonly known as:  NO BRAND SPECIFIED   1 Device 4 times daily ,brand per patient insurance insurance coverage.                                * blood glucose monitoring lancets   Use to test blood sugar 2 times daily or as directed.                                * Notice:  This list has 4 medication(s) that are the same as other medications prescribed for you. Read the directions carefully, and ask your doctor or other care provider to review them with you.              More Information        Discharge Instructions for Total Knee Replacement  You have undergone knee replacement surgery. The knee joint forms where the thighbone, shinbone, and kneecap meet. The knee joint is supported by muscles and ligaments, and is lined with a cushioning called cartilage. Over time, cartilage wears away. This can make the knee feel stiff and painful. Your doctor replaced your painful joint with an artificial joint to relieve pain and restore movement. Here are some instructions to follow once at home.  Home care    When your doctor says it's OK to shower, carefully wash your incision with soap and water. Rinse the incision well. Then gently pat it dry. Don t rub the incision, or apply creams or lotions. Sit on a shower stool or chair when you shower to keep from falling.    Take pain medicine as directed by your doctor.  Sitting and sleeping    Sit in chairs with arms. The arms make it easier for you to stand up or sit down.    Don t sit for more than 30 to 45 minutes at one time.    Nap if you are tired, but don t stay in bed all day.    Sleep with a pillow under your ankle, not your knee. Be sure to change the position of your leg during the night.  Moving safely    The key to successful recovery is movement with walking and exercising your knee as directed by your doctor. You should be able to start moving your leg shortly after surgery as directed by your doctor.      Walk up and down stairs with support. Try one  step at a time. Use the railing if possible.    Don t drive until your doctor says it s OK. Most people can start driving about 6 weeks after surgery. Don t drive while you are taking opioid pain medication.  Other precautions    Avoid soaking your knee in water (no hot tubs, bathtubs, swimming pools) until your doctor says it s OK.    Wear the support stockings you were given in the hospital, as instructed by your doctor. You may wear these stockings for 4 to 6 weeks after surgery. If needed, you can place a bandage over the incision to prevent irritation from clothing or support stockings.    Arrange your household to keep the items you need handy. Keep everything else out of the way. Remove items that may cause you to fall, such as throw rugs and electrical cords.    Use nonslip bath mats, grab bars, an elevated toilet seat, and a shower chair in your bathroom.    Until your balance, flexibility, and strength improve, use a cane, crutches, a walker, handrails, or someone to help you.    Keep your hands free by using a backpack, indra pack, apron, or pockets to carry things.    Prevent infection. Ask your doctor for instructions if you haven t already received them. Any infection will need to be treated immediately. Call your doctor right away if you think you might have an infection.    Tell your dentist that you have an artificial joint and take antibiotics as prescribed before any dental work.    Tell all your healthcare providers about your artificial joint before any medical procedure.    Maintain a healthy weight. Get help to lose any extra pounds. Added body weight puts stress on the knee.    Take any medicine you may have been given after surgery. This may include blood-thinning medicine to prevent blood clots or antibiotics to prevent infection.  Follow-up care  Follow up with your healthcare provider, or as advised. You will need to have your staples removed 2 to 3 weeks after surgery.     When to call  your healthcare provider  Call 911 right away if you have:    Chest pain    Shortness of breath    Any pain or tenderness in your calf  Otherwise, call your healthcare provider right away if you have:    Fever of 100.4  F (38  C) or higher, or as advised    Shaking chills    Stiffness, or inability to move the knee    Increased swelling in your leg    Increased redness, tenderness, or swelling in or around the knee incision    Drainage from the knee incision    Increased knee pain   Date Last Reviewed: 7/1/2016 2000-2017 [employer]. [address] [place], [place] [address]. All rights reserved. This information is not intended as a substitute for professional medical care. Always follow your healthcare professional's instructions.

## 2017-11-22 NOTE — ADDENDUM NOTE
Addendum  created 11/22/17 1336 by Angela Mancini APRN CRNA    Anesthesia Event edited, Anesthesia Intra Blocks edited, Anesthesia Intra Meds edited, Anesthesia Staff edited, Child order released for a procedure order, Procedure Event Log accessed, Sign clinical note

## 2017-11-22 NOTE — PROGRESS NOTES
"[facility]   Cross Cover Note  Date of Service: 11/22/2017     Notified RN that upon arrival to the floor, the patient's BP is 190s/80s.  Spinal reportedly \"wore off\" rapidly and patient is in a great deal of pain.      Plan:  - attempt oxycodone for pain control  - if BP remains elevated, will order PRN labetalol 20mg Q4 hours with parameters      Yolanda Wooten PA-C    "

## 2017-11-22 NOTE — ANESTHESIA PREPROCEDURE EVALUATION
Anesthesia Evaluation     . Pt has had prior anesthetic. Type: General, Regional and MAC    No history of anesthetic complications          ROS/MED HX    ENT/Pulmonary:     (+)allergic rhinitis, Intermittent asthma (pt states not an issue-does not use inhaler) , . .    Neurologic:  - neg neurologic ROS     Cardiovascular:     (+) hypertension--CAD, angina--. : . . . :. . Previous cardiac testing date:results:Stress Testdate:2/2013 results:Normal ECG reviewed date:11/3/17 results:Sinus bradycardia, HR 56 and inferior infarct-age undetermined. Compared to EKG 2013, LBBB has resolved.   date: results:          METS/Exercise Tolerance:  >4 METS   Hematologic:  - neg hematologic  ROS       Musculoskeletal:   (+) arthritis, , , -       GI/Hepatic:     (+) GERD Asymptomatic on medication,       Renal/Genitourinary:  - ROS Renal section negative       Endo:     (+) type II DM Last HgA1c: 6.8 date: 11/3/17 Not using insulin .      Psychiatric:  - neg psychiatric ROS       Infectious Disease:         Malignancy:         Other:                     Physical Exam  Normal systems: cardiovascular, pulmonary and dental    Airway   Mallampati: II  TM distance: >3 FB  Neck ROM: full    Dental     Cardiovascular       Pulmonary                     Anesthesia Plan      History & Physical Review  History and physical reviewed and following examination; no interval change.    ASA Status:  3 .    NPO Status:  > 6 hours    Plan for Spinal Maintenance will be Balanced.    PONV prophylaxis:  Ondansetron (or other 5HT-3) and Dexamethasone or Solumedrol  All labs reviewed from 11/3/17      Postoperative Care  Postoperative pain management:  IV analgesics, Oral pain medications and Neuraxial analgesia.      Consents  Anesthetic plan, risks, benefits and alternatives discussed with:  Patient..                          .

## 2017-11-22 NOTE — OR NURSING
Adductor Canal Block done by Angela Mancini CRNA. Dwain. Procedure well. Taking in small amt.s of ice-chips and dwain. Well. Denies nausea. States that the pain medicine is helping her right knee pain.

## 2017-11-22 NOTE — ANESTHESIA PROCEDURE NOTES
Peripheral nerve/Neuraxial procedure note : intrathecal  Pre-Procedure  Performed by  YANETH CANTOR   Location: OR      Pre-Anesthestic Checklist: patient identified, IV checked, risks and benefits discussed, informed consent, monitors and equipment checked and pre-op evaluation    Timeout  Correct Patient: Yes   Correct Procedure: Yes   Correct Site: Yes   Correct Laterality: N/A   Correct Position: Yes   Site Marked: N/A   .   Procedure Documentation  ASA 3  Diagnosis:R TKA.    Procedure:    Intrathecal.  Insertion Site:L3-4  (midline approach)      Patient Prep;mask, sterile gloves, povidone-iodine 7.5% surgical scrub, patient draped.  .  Needle: Benjy tip Spinal Needle (gauge): 25  Spinal/LP Needle Length (inches): 3.5 # of attempts: 1 and # of redirects:  No introducer used .       Assessment/Narrative  Paresthesias: No.  .  .  clear CSF fluid removed . Time Injected: 09:48  Comments:  Pt tolerated well.

## 2017-11-22 NOTE — PLAN OF CARE
"Problem: Patient Care Overview  Goal: Plan of Care/Patient Progress Review  Outcome: No Change  [place] [facility] ADMISSION NOTE    Patient admitted to room 2308 at approximately 1430 via cart from surgery. Patient was accompanied by transport tech.     Verbal SBAR report received from Elizabeth prior to patient arrival.     Patient transferred to bed via air glenny. Patient alert and oriented X 3. The patient is not having any pain. Admission vital signs: Blood pressure 161/82, pulse 77, temperature 96.8  F (36  C), temperature source Oral, resp. rate 16, height 1.6 m (5' 3\"), weight 87.2 kg (192 lb 3.9 oz), SpO2 96 %, not currently breastfeeding. Patient was oriented to room, call light, plan of care, bathroom and visiting hours.     The following safety risks were identified during admission: fall. Yellow risk band applied: YES.     Laura Guillaume        "

## 2017-11-22 NOTE — ANESTHESIA PROCEDURE NOTES
Peripheral nerve/Neuraxial procedure note : Adductor canal  Pre-Procedure  Performed by  YANETH CANTOR   Location: post-op      Pre-Anesthestic Checklist: patient identified, IV checked, site marked, risks and benefits discussed, informed consent, monitors and equipment checked, pre-op evaluation, at physician/surgeon's request and post-op pain management    Timeout  Correct Patient: Yes   Correct Procedure: Yes   Correct Site: Yes   Correct Laterality: Yes   Correct Position: Yes   Site Marked: Yes   .   Procedure Documentation    Diagnosis:POST OP PAIN.    Procedure:    Adductor canal.  Local skin infiltrated with 3 mL of 1% lidocaine.     Ultrasound used to identify targeted nerve, plexus, or vascular marker and placed a needle adjacent to it., Ultrasound was used to visualize the spread of the anesthetic in close proximity to the above stated nerve. A permanent image is entered into the patient's record.  Patient Prep;mask, sterile gloves, chlorhexidine gluconate and isopropyl alcohol, patient draped.  .  Needle: insulated Needle Gauge: 21.    Needle Length (Inches) 4  Insertion Method: Single Shot.       Assessment/Narrative  Paresthesias: No.  Injection made incrementally with aspirations every 5 mL..  The placement was negative for: blood aspirated, painful injection and site bleeding.  Bolus given via needle..   Secured via.   Complications: none.

## 2017-11-22 NOTE — IP AVS SNAPSHOT
[facility]    [address] [address]    Phone:  [phone]    Fax:  [phone]                                       After Visit Summary   11/22/2017    Frances Cates    MRN: [id number]           After Visit Summary Signature Page     I have received my discharge instructions, and my questions have been answered. I have discussed any challenges I see with this plan with the nurse or doctor.    ..........................................................................................................................................  Patient/Patient Representative Signature      ..........................................................................................................................................  Patient Representative Print Name and Relationship to Patient    ..................................................               ................................................  Date                                            Time    ..........................................................................................................................................  Reviewed by Signature/Title    ...................................................              ..............................................  Date                                                            Time

## 2017-11-22 NOTE — PROGRESS NOTES
/83.  Darline MELTON updated.  Pt has history of HTN, took AM home meds prior to surgery.  Orders for prn BP meds received.  Pt is having knee pain now, given tylenol and prn oxycodone.  Ice pack applied.  Advised by Darline to allow pain meds some time to work before administering prn bp meds.  Will update oncoming RN.

## 2017-11-22 NOTE — LETTER
Transition Communication Hand-off for Care Transitions to Next Level of Care Provider    Name: Frances Cates  MRN #: [id number]  Primary Care Provider: Megan Atkinson     Primary Clinic: [facility] [address] [address] [address]     Reason for Hospitalization:  aseptic loosening  S/P total knee arthroplasty  Admit Date/Time: 11/22/2017  7:57 AM  Discharge Date: 11/24/17  Payor Source: Payor: WORK COMP / Plan:  ACCIDENT FUND INS[employer]ANCE [employer] OF [employer] / Product Type: *No Product type* /     Readmission Assessment Measure (ELTON) Risk Score/category: Low         Reason for Communication Hand-off Referral: Other Home Care    Discharge Plan: [facility] ([phone] Fax: [phone])    Concern for non-adherence with plan of care:   Y/N : No    Discharge Needs Assessment:  Needs       Most Recent Value    Transportation Available car, family or friend will provide    Home Care [facility] [phone], Fax: [phone]        Follow-up plan:  No future appointments.    Any outstanding tests or procedures:        Referrals     Future Labs/Procedures    HOME CARE NURSING REFERRAL     Comments:    **Order classes of: [facility], [facility] and [facility] will route to the Home Care and Hospice Referral Pool.  Home Care or Hospice will then contact the patient to schedule their appointment.**    If you do not hear from Home Care and Hospice, or you would like to call to schedule, please call the referring place of service that your provider has listed beFMG: [facility] [phone]   [email or web address].  ______________________________________________________________________    Your provider has referred you to:     Extended Emergency Contact Information  Primary Emergency Contact: Peyton [place] [address] [facility]  Home Phone: [phone]  Work  Phone: [phone]  Mobile Phone: [phone]  Relation: Daughter    Patient Anticipated Discharge Date: 11/24   RN, PT, HHA to begin 24 - 48 hours after discharge.  PLEASE EVALUATE AND TREAT (Evaluation timeline is 24 - 48 hrs. Please call if there is need for a variance to this timeline).    REASON FOR REFERRAL: Assessment & Treatment: PT    ADDITIONAL SERVICES NEEDED: HHA    OTHER PERTINENT INFORMATION: Patient was last seen by provider on 11/23 for discharge.    Current Outpatient Prescriptions:  oxyCODONE IR (ROXICODONE) 5 MG tablet, Take 1-2 tablets (5-10 mg) by mouth every 3 hours as needed for moderate to severe pain, Disp: 90 tablet, Rfl: 0  [START ON 11/25/2017] acetaminophen (TYLENOL) 325 MG tablet, Take 2 tablets (650 mg) by mouth every 4 hours as needed for other (surgical pain), Disp: 100 tablet, Rfl: 0  aspirin  MG EC tablet, Take 1 tablet (325 mg) by mouth daily, Disp: 42 tablet, Rfl: 0  hydrOXYzine (ATARAX) 25 MG tablet, Take 1 tablet (25 mg) by mouth every 6 hours as needed for itching, Disp: 120 tablet, Rfl: 0  order for DME, Equipment being ordered: CPM, Disp: 1 Device, Rfl: 0      Patient Active Problem List:     Essential hypertension, benign     Allergic rhinitis     Chronic gingivitis     JOINT REPLACEMENT KNEE     Generalized osteoarthrosis, unspecified site     Diverticulosis of colon     Type 2 diabetes mellitus without complication (H)     Advanced directives, counseling/discussionFULL CODE 06/02/11      Vitamin D deficiency     Intermittent asthma     Atypical chest pain     Status post right knee replacement     S/P total knee arthroplasty      Documentation of Face to Face and Certification for Home Health Services    I certify that patientFrances is under my care and that I, or a Nurse Practitioner or Physician's Assistant working with me, had a face-to-face encounter that meets the physician face-to-face encounter requirements with this patient on: 11/25.    This  encounter with the patient was in whole, or in part, for the following medical condition, which is the primary reason for Home Health Care: pt rn.    I certify that, based on my findings, the following services are medically necessary Home Health Services: Physical Therapy    My clinical findings support the need for the above services because: Physical Therapy Services are needed to assess and treat the following functional impairments: mobility.    Further, I certify that my clinical findings support that this patient is homebound (i.e. absences from home require considerable and taxing effort and are for medical reasons or Spiritism services or infrequently or of short duration when for other reasons) because: Requires assistance of another person or specialized equipment to access medical services because patient: Has prohibitive pain during ambulation..    Based on the above findings, I certify that this patient is confined to the home and needs intermittent skilled nursing care, physical therapy and/or speech therapy.  The patient is under my care, and I have initiated the establishment of the plan of care.  This patient will be followed by a physician who will periodically review the plan of care.    Physician/Provider to provide follow up care: Megan Atkinson certified Physician at time of discharge: 1400    Please be aware that coverage of these services is subject to the terms and limitations of your health insurance plan.  Call member services at your health plan with any benefit or coverage questions.    Home Care PT Referral for Hospital Discharge     Comments:    PT to eval and treat    Your provider has ordered home care - physical therapy. If you have not been contacted within 2 days of your discharge please call the department phone number listed on the top of this document.          Supplies     Future Labs/Procedures    CPM (Equipment)     Scheduling Instructions:    Set  Max tolerance.  Increase 2-3 degrees per day as tolerated. Maximum of 8 hours per day.          KELL Ly  [facility] [phone]/ [facility] [phone]      AVS/Discharge Summary is the source of truth; this is a helpful guide for improved communication of patient story

## 2017-11-22 NOTE — ANESTHESIA POSTPROCEDURE EVALUATION
Patient: Frances Cates    Procedure(s):  Revision Right Total Knee Arthroplasty - Wound Class: I-Clean    Diagnosis:aseptic loosening  Diagnosis Additional Information: No value filed.    Anesthesia Type:  Spinal    Note:  Anesthesia Post Evaluation    Patient location during evaluation: Bedside  Patient participation: Able to fully participate in evaluation  Level of consciousness: awake and alert  Pain management: adequate  Airway patency: patent  Cardiovascular status: acceptable  Respiratory status: acceptable  Hydration status: acceptable  PONV: none     Anesthetic complications: None          Last vitals:  Vitals:    11/22/17 0807   BP: (!) 138/98   Pulse: 87   Resp: 18   Temp: 36.8  C (98.2  F)   SpO2: 98%         Electronically Signed By: Macario Schmitz CRNA, APRN [facility]  November 22, 2017  12:51 PM

## 2017-11-23 ENCOUNTER — APPOINTMENT (OUTPATIENT)
Dept: PHYSICAL THERAPY | Facility: CLINIC | Age: 72
DRG: 468 | End: 2017-11-23
Attending: ORTHOPAEDIC SURGERY
Payer: OTHER MISCELLANEOUS

## 2017-11-23 LAB
ANION GAP SERPL CALCULATED.3IONS-SCNC: 7 MMOL/L (ref 3–14)
BUN SERPL-MCNC: 13 MG/DL (ref 7–30)
CALCIUM SERPL-MCNC: 8.2 MG/DL (ref 8.5–10.1)
CHLORIDE SERPL-SCNC: 104 MMOL/L (ref 94–109)
CO2 SERPL-SCNC: 27 MMOL/L (ref 20–32)
CREAT SERPL-MCNC: 0.51 MG/DL (ref 0.52–1.04)
GFR SERPL CREATININE-BSD FRML MDRD: >90 ML/MIN/1.7M2
GLUCOSE BLDC GLUCOMTR-MCNC: 171 MG/DL (ref 70–99)
GLUCOSE BLDC GLUCOMTR-MCNC: 190 MG/DL (ref 70–99)
GLUCOSE BLDC GLUCOMTR-MCNC: 203 MG/DL (ref 70–99)
GLUCOSE BLDC GLUCOMTR-MCNC: 237 MG/DL (ref 70–99)
GLUCOSE BLDC GLUCOMTR-MCNC: 240 MG/DL (ref 70–99)
GLUCOSE SERPL-MCNC: 187 MG/DL (ref 70–99)
HBA1C MFR BLD: 6.6 % (ref 4.3–6)
HGB BLD-MCNC: 11.7 G/DL (ref 11.7–15.7)
POTASSIUM SERPL-SCNC: 3.9 MMOL/L (ref 3.4–5.3)
SODIUM SERPL-SCNC: 138 MMOL/L (ref 133–144)

## 2017-11-23 PROCEDURE — 40000193 ZZH STATISTIC PT WARD VISIT: Performed by: PHYSICAL THERAPIST

## 2017-11-23 PROCEDURE — 12000000 ZZH R&B MED SURG/OB

## 2017-11-23 PROCEDURE — 00000146 ZZHCL STATISTIC GLUCOSE BY METER IP

## 2017-11-23 PROCEDURE — 80048 BASIC METABOLIC PNL TOTAL CA: CPT | Performed by: ORTHOPAEDIC SURGERY

## 2017-11-23 PROCEDURE — 25000128 H RX IP 250 OP 636: Performed by: ORTHOPAEDIC SURGERY

## 2017-11-23 PROCEDURE — 99231 SBSQ HOSP IP/OBS SF/LOW 25: CPT | Performed by: PHYSICIAN ASSISTANT

## 2017-11-23 PROCEDURE — 97110 THERAPEUTIC EXERCISES: CPT | Mod: GP | Performed by: PHYSICAL THERAPIST

## 2017-11-23 PROCEDURE — 85018 HEMOGLOBIN: CPT | Performed by: ORTHOPAEDIC SURGERY

## 2017-11-23 PROCEDURE — 97116 GAIT TRAINING THERAPY: CPT | Mod: GP | Performed by: PHYSICAL THERAPIST

## 2017-11-23 PROCEDURE — 25000132 ZZH RX MED GY IP 250 OP 250 PS 637: Performed by: ORTHOPAEDIC SURGERY

## 2017-11-23 PROCEDURE — 25000132 ZZH RX MED GY IP 250 OP 250 PS 637: Performed by: PHYSICIAN ASSISTANT

## 2017-11-23 PROCEDURE — 36415 COLL VENOUS BLD VENIPUNCTURE: CPT | Performed by: ORTHOPAEDIC SURGERY

## 2017-11-23 PROCEDURE — 97530 THERAPEUTIC ACTIVITIES: CPT | Mod: GP | Performed by: PHYSICAL THERAPIST

## 2017-11-23 PROCEDURE — 97162 PT EVAL MOD COMPLEX 30 MIN: CPT | Mod: GP | Performed by: PHYSICAL THERAPIST

## 2017-11-23 PROCEDURE — 12000007 ZZH R&B INTERMEDIATE

## 2017-11-23 PROCEDURE — 83036 HEMOGLOBIN GLYCOSYLATED A1C: CPT | Performed by: PHYSICIAN ASSISTANT

## 2017-11-23 RX ORDER — ASPIRIN 325 MG
325 TABLET, DELAYED RELEASE (ENTERIC COATED) ORAL DAILY
Qty: 42 TABLET | Refills: 0 | Status: SHIPPED | OUTPATIENT
Start: 2017-11-24

## 2017-11-23 RX ORDER — OXYCODONE HYDROCHLORIDE 5 MG/1
5-10 TABLET ORAL
Qty: 90 TABLET | Refills: 0 | Status: SHIPPED | OUTPATIENT
Start: 2017-11-23 | End: 2018-07-13

## 2017-11-23 RX ORDER — ACETAMINOPHEN 325 MG/1
650 TABLET ORAL EVERY 4 HOURS PRN
Qty: 100 TABLET | Refills: 0 | Status: SHIPPED | OUTPATIENT
Start: 2017-11-25 | End: 2018-07-13

## 2017-11-23 RX ORDER — HYDROXYZINE HYDROCHLORIDE 25 MG/1
25 TABLET, FILM COATED ORAL EVERY 6 HOURS PRN
Qty: 120 TABLET | Refills: 0 | Status: SHIPPED | OUTPATIENT
Start: 2017-11-23 | End: 2018-07-13

## 2017-11-23 RX ADMIN — INSULIN ASPART 1 UNITS: 100 INJECTION, SOLUTION INTRAVENOUS; SUBCUTANEOUS at 08:53

## 2017-11-23 RX ADMIN — CEFAZOLIN SODIUM 2 G: 2 INJECTION, SOLUTION INTRAVENOUS at 02:31

## 2017-11-23 RX ADMIN — CYCLOBENZAPRINE 5 MG: 5 TABLET, FILM COATED ORAL at 21:04

## 2017-11-23 RX ADMIN — MICONAZOLE NITRATE: 2 POWDER TOPICAL at 22:08

## 2017-11-23 RX ADMIN — OXYCODONE HYDROCHLORIDE 10 MG: 5 TABLET ORAL at 22:06

## 2017-11-23 RX ADMIN — OXYCODONE HYDROCHLORIDE 10 MG: 5 TABLET ORAL at 19:04

## 2017-11-23 RX ADMIN — OXYCODONE HYDROCHLORIDE 10 MG: 5 TABLET ORAL at 03:42

## 2017-11-23 RX ADMIN — INSULIN ASPART 2 UNITS: 100 INJECTION, SOLUTION INTRAVENOUS; SUBCUTANEOUS at 17:27

## 2017-11-23 RX ADMIN — SENNOSIDES AND DOCUSATE SODIUM 1 TABLET: 8.6; 5 TABLET ORAL at 19:42

## 2017-11-23 RX ADMIN — OXYCODONE HYDROCHLORIDE 10 MG: 5 TABLET ORAL at 00:04

## 2017-11-23 RX ADMIN — OXYCODONE HYDROCHLORIDE 10 MG: 5 TABLET ORAL at 16:07

## 2017-11-23 RX ADMIN — ACETAMINOPHEN 975 MG: 325 TABLET, FILM COATED ORAL at 13:57

## 2017-11-23 RX ADMIN — MICONAZOLE NITRATE: 2 POWDER TOPICAL at 14:02

## 2017-11-23 RX ADMIN — ASPIRIN 325 MG: 325 TABLET, COATED ORAL at 08:52

## 2017-11-23 RX ADMIN — ACETAMINOPHEN 975 MG: 325 TABLET, FILM COATED ORAL at 22:05

## 2017-11-23 RX ADMIN — SENNOSIDES AND DOCUSATE SODIUM 1 TABLET: 8.6; 5 TABLET ORAL at 08:53

## 2017-11-23 RX ADMIN — HYDROCHLOROTHIAZIDE 25 MG: 12.5 CAPSULE ORAL at 08:53

## 2017-11-23 RX ADMIN — ACETAMINOPHEN 975 MG: 325 TABLET, FILM COATED ORAL at 06:57

## 2017-11-23 RX ADMIN — LOSARTAN POTASSIUM 100 MG: 50 TABLET, FILM COATED ORAL at 08:52

## 2017-11-23 RX ADMIN — OXYCODONE HYDROCHLORIDE 10 MG: 5 TABLET ORAL at 06:57

## 2017-11-23 RX ADMIN — INSULIN ASPART 2 UNITS: 100 INJECTION, SOLUTION INTRAVENOUS; SUBCUTANEOUS at 11:56

## 2017-11-23 RX ADMIN — OXYCODONE HYDROCHLORIDE 10 MG: 5 TABLET ORAL at 12:50

## 2017-11-23 RX ADMIN — OXYCODONE HYDROCHLORIDE 10 MG: 5 TABLET ORAL at 10:01

## 2017-11-23 RX ADMIN — AMLODIPINE BESYLATE 10 MG: 10 TABLET ORAL at 08:52

## 2017-11-23 NOTE — PLAN OF CARE
Problem: Patient Care Overview  Goal: Plan of Care/Patient Progress Review  Outcome: Improving  Pt sitting in recliner with legs elevated at 0830. Reports pain controlled with Tylenol and Oxycodone, ice to knee. Tolerated diabetic diet without nausea. Pt ambulating seo with PT, completed stairs without difficulty per PT. Pt requested this writer contact Melanie MELTON to set up possible dc this afternoon.   1030 removed ACE wrap, surgical dressing in place per orders, C/D/I. Slight edema noted. Pt stated she wants to stay tonight-not ready to dc today. Notified charge RN, await Hospitalist rounding. Skin pink abdominal folds, paged Dr Etienne requesting antifungal powder.  Pt completed bath this am independently, refused to allow staff to STAND BY ASSIST when ambulating in room. Explained risk of fall, pt states she is safe and continued to refuse assist when up in room, using walker. Call light in reach. Gait is steady.

## 2017-11-23 NOTE — PLAN OF CARE
Problem: Patient Care Overview  Goal: Plan of Care/Patient Progress Review  Discharge Planner PT   Patient plan for discharge: Home wit family and home care  Current status: Pain being controlled well, progressing toward acute care goals of transfers, stairs and HEP with modified independence for safe return home  Barriers to return to prior living situation: pain management throughout the day  Recommendations for discharge: home care and home with family support.  PLOF works FT and lives with adult dtr  Rationale for recommendations: Level of safety, independence and pain management does not appear the need for continued hospital stay and medical service.       Entered by: Mattie Ramos 11/23/2017 9:24 AM

## 2017-11-23 NOTE — DISCHARGE INSTRUCTIONS
Oxycodone for moderate to severe pain  Tylenol for mild to moderate pain  Vistaril for muscle spasm, itching and adjunctive pain control  Start Aspirin 325mg for 42 days. After this is completed, please resume your normal home dose of 81mg Aspirin.  Keep icing knee  CPM to work on ROM    Home care Physical therapy ordered

## 2017-11-23 NOTE — PHARMACY - DISCHARGE MEDICATION RECONCILIATION
Discharge medication review for this patient is complete. Pharmacist assisted with medication reconciliation of discharge medications with prior to admission medications.     The following changes were made to the discharge medication list based on pharmacist review:  Added:  none  Discontinued: 81mg Aspirin with instructions to resume once the 42 days of 325mg ASA is completed.  Changed: none      Patient's Discharge Medication List  - medications as listed on After Visit Summary (AVS)     Review of your medicines      START taking       Dose / Directions    acetaminophen 325 MG tablet   Commonly known as:  TYLENOL        Dose:  650 mg   Start taking on:  11/25/2017   Take 2 tablets (650 mg) by mouth every 4 hours as needed for other (surgical pain)   Quantity:  100 tablet   Refills:  0       aspirin 325 MG EC tablet   Replaces:  aspirin 81 MG tablet        Dose:  325 mg   Start taking on:  11/24/2017   Take 1 tablet (325 mg) by mouth daily   Quantity:  42 tablet   Refills:  0       hydrOXYzine 25 MG tablet   Commonly known as:  ATARAX        Dose:  25 mg   Take 1 tablet (25 mg) by mouth every 6 hours as needed for itching   Quantity:  120 tablet   Refills:  0       order for DME        Equipment being ordered: CPM   Quantity:  1 Device   Refills:  0       oxyCODONE IR 5 MG tablet   Commonly known as:  ROXICODONE        Dose:  5-10 mg   Take 1-2 tablets (5-10 mg) by mouth every 3 hours as needed for moderate to severe pain   Quantity:  90 tablet   Refills:  0         CONTINUE these medicines which have NOT CHANGED       Dose / Directions    amLODIPine 10 MG tablet   Commonly known as:  NORVASC   Used for:  Essential hypertension, benign        Dose:  10 mg   Take 1 tablet (10 mg) by mouth daily   Quantity:  90 tablet   Refills:  3       blood glucose monitoring test strip   Commonly known as:  ACCU-CHEK SMARTVIEW   Used for:  Type 2 diabetes mellitus without complication (H)        Use to test blood sugar 2 times  daily or as directed.   Quantity:  100 each   Refills:  5       dicyclomine 10 MG capsule   Commonly known as:  BENTYL   Used for:  Abdominal cramps        Dose:  10-20 mg   Take 1-2 capsules (10-20 mg) by mouth 4 times daily as needed   Quantity:  60 capsule   Refills:  0       GLIPIZIDE PO        Dose:  5 mg   Take 5 mg by mouth 2 times daily (before meals)   Refills:  0       ibuprofen 200 MG tablet   Commonly known as:  ADVIL/MOTRIN   Used for:  S/P D&C (status post dilation and curettage)        Dose:  200-400 mg   Take 1-2 tablets by mouth every 6 hours as needed for other (mild pain).   Quantity:  100 tablet   Refills:  0       losartan-hydrochlorothiazide 100-25 MG per tablet   Commonly known as:  HYZAAR   Used for:  Essential hypertension, benign        Dose:  1 tablet   Take 1 tablet by mouth daily   Quantity:  90 tablet   Refills:  3       metFORMIN 500 MG 24 hr tablet   Commonly known as:  GLUCOPHAGE-XR   Used for:  Type 2 diabetes mellitus without complication, unspecified long term insulin use status (H)        Dose:  1000 mg   Take 2 tablets (1,000 mg) by mouth 2 times daily (with meals) Labs DUE August 2017   Quantity:  120 tablet   Refills:  0       STATIN NOT PRESCRIBED (INTENTIONAL)        Refills:  0       * thin lancets   Commonly known as:  NO BRAND SPECIFIED   Used for:  Type 2 diabetes, HbA1C goal < 8% (H)        Dose:  1 Device   1 Device 4 times daily ,brand per patient insurance insurance coverage.   Quantity:  300 each   Refills:  0       * blood glucose monitoring lancets   Used for:  Type 2 diabetes, HbA1C goal < 8% (H)        Use to test blood sugar 2 times daily or as directed.   Quantity:  1 Box   Refills:  11       * Notice:  This list has 2 medication(s) that are the same as other medications prescribed for you. Read the directions carefully, and ask your doctor or other care provider to review them with you.      STOP taking          aspirin 81 MG tablet   Replaced by:  aspirin  325 MG EC tablet                Where to get your medicines      Some of these will need a paper prescription and others can be bought over the counter. Ask your nurse if you have questions.     Bring a paper prescription for each of these medications      acetaminophen 325 MG tablet     aspirin 325 MG EC tablet     hydrOXYzine 25 MG tablet     order for DME     oxyCODONE IR 5 MG tablet

## 2017-11-23 NOTE — PLAN OF CARE
Problem: Patient Care Overview  Goal: Plan of Care/Patient Progress Review  Outcome: Improving  Pt pain controlled taking PRN 10 mg Oxycodone, has received x2 this shift; using ice pack to right knee. CMS intact, dressing c/d/i. Vital signs stable, afebrile. Denies any N/V or SOB. Passing flatus. Stuart intact. Pt is drinking fluids without difficulty. IV fluids running at 100 ml/hr. Ambulates with assist x1 using a walker. Uses call light appropriately. Appears sleeping between cares.

## 2017-11-23 NOTE — PROGRESS NOTES
Knee pain controlled with Oxycodone, Tylenol, ice to knee. Ambulating in room with walker, reclined in chair at this time. CPM use x3 today. Tolerating Diabetic diet.

## 2017-11-23 NOTE — DISCHARGE SUMMARY
[facility] Orthopedics Discharge Summary                                  [facility]     SARAH WELLS [id number]   Age: 71 year old  PCP: Megan Atkinson, [phone] 1945     Date of Admission:  11/22/2017  Date of Discharge::  11/23/2017  Discharge Physician:  Darline Durán    Code status:  Full Code    Admission Information:  Admission Diagnosis:  aseptic loosening  S/P total knee arthroplasty    Post-Operative Day: 1 Day Post-Op     Reason for admission:  The patient was admitted for the following:Procedure(s) (LRB):  ARTHROPLASTY REVISION KNEE (Right)    Principal Problem:    Status post right knee replacement  Active Problems:    Essential hypertension, benign    Type 2 diabetes mellitus without complication (H)    S/P total knee arthroplasty      Allergies:  Codeine    Following the procedure noted above the patient was transferred to the post-op floor and started on:    Therapy:  physical therapy  Anticoagulation Plan:  mg daily  for 42 days  Pain Management: oxycodone, tylenol and vistaril  Weight bearing status: Weight bearing as tolerated     The patient was followed and co-managed by the hospitalist service during the inpatient treatment course  Complications:  None  Consultations:  None     Pertinent Labs   Lab Results: personally reviewed.     Recent Labs   Lab Test  11/23/17   0635  01/12/17   0828  12/15/16   0857  03/21/16   1449  09/10/15   0824   02/14/13   0919  02/03/13 2020   HGB  11.7   --    --   14.4   --    --   14.0  14.8   HCT   --    --    --   42.3   --    --   40.4  44.3   MCV   --    --    --   86   --    --   84  85   PLT   --    --    --   241   --    --   271  311   NA  138   --   133   --   138   < >   --   140   CRP   --   <2.9   --    --    --    --    --    --     < > = values in this interval not displayed.          Discharge Information:  Discharged on 11/23/2017 if continuing to meet PT goals and pain is well controlled.  Condition  at discharge: Stable  Discharge destination:  Discharged to home     Medications at discharge:  Current Discharge Medication List      START taking these medications    Details   oxyCODONE IR (ROXICODONE) 5 MG tablet Take 1-2 tablets (5-10 mg) by mouth every 3 hours as needed for moderate to severe pain  Qty: 90 tablet, Refills: 0    Associated Diagnoses: Status post total right knee replacement      acetaminophen (TYLENOL) 325 MG tablet Take 2 tablets (650 mg) by mouth every 4 hours as needed for other (surgical pain)  Qty: 100 tablet, Refills: 0    Associated Diagnoses: Status post total right knee replacement      aspirin  MG EC tablet Take 1 tablet (325 mg) by mouth daily  Qty: 42 tablet, Refills: 0    Associated Diagnoses: Status post total right knee replacement      hydrOXYzine (ATARAX) 25 MG tablet Take 1 tablet (25 mg) by mouth every 6 hours as needed for itching  Qty: 120 tablet, Refills: 0    Associated Diagnoses: Status post total right knee replacement      order for DME Equipment being ordered: CPM  Qty: 1 Device, Refills: 0    Associated Diagnoses: Status post total right knee replacement         CONTINUE these medications which have NOT CHANGED    Details   GLIPIZIDE PO Take 5 mg by mouth 2 times daily (before meals)      metFORMIN (GLUCOPHAGE-XR) 500 MG 24 hr tablet Take 2 tablets (1,000 mg) by mouth 2 times daily (with meals) Labs DUE August 2017  Qty: 120 tablet, Refills: 0    Associated Diagnoses: Type 2 diabetes mellitus without complication, unspecified long term insulin use status (H)      amLODIPine (NORVASC) 10 MG tablet Take 1 tablet (10 mg) by mouth daily  Qty: 90 tablet, Refills: 3    Associated Diagnoses: Essential hypertension, benign      losartan-hydrochlorothiazide (HYZAAR) 100-25 MG per tablet Take 1 tablet by mouth daily  Qty: 90 tablet, Refills: 3    Associated Diagnoses: Essential hypertension, benign      dicyclomine (BENTYL) 10 MG capsule Take 1-2 capsules (10-20 mg) by  mouth 4 times daily as needed  Qty: 60 capsule, Refills: 0    Associated Diagnoses: Abdominal cramps      blood glucose monitoring (ACCU-CHEK SMARTVIEW) test strip Use to test blood sugar 2 times daily or as directed.  Qty: 100 each, Refills: 5    Associated Diagnoses: Type 2 diabetes mellitus without complication (H)      !! blood glucose monitoring (ACCU-CHEK FASTCLIX) lancets Use to test blood sugar 2 times daily or as directed.  Qty: 1 Box, Refills: 11    Associated Diagnoses: Type 2 diabetes, HbA1C goal < 8% (H)      !! lancets thin MISC 1 Device 4 times daily ,brand per patient insurance insurance coverage.  Qty: 300 each, Refills: 0    Comments: Please give per patient's insurance coverage  Associated Diagnoses: Type 2 diabetes, HbA1C goal < 8% (H)      ibuprofen (ADVIL,MOTRIN) 200 MG tablet Take 1-2 tablets by mouth every 6 hours as needed for other (mild pain).  Qty: 100 tablet, Refills: 0    Comments: As needed  Associated Diagnoses: S/P D&C (status post dilation and curettage)      STATIN NOT PRESCRIBED, INTENTIONAL, Refills: 0    Comments: LDL has been borderline, most recently <100 without statin, patient reluctant to add more drugs      ASPIRIN 81 MG OR TABS 1 tab po QD (Once per day)  Qty: 100, Refills: 3    Associated Diagnoses: Type II or unspecified type diabetes mellitus without mention of complication, not stated as uncontrolled       !! - Potential duplicate medications found. Please discuss with provider.                     Follow-Up Care:  Patient should be seen in the office in 10-14 days by the Orthopedic Surgeon/Physician Assistant.  Call [phone] for appointment or questions.    Darline Durán MS, PA-C

## 2017-11-23 NOTE — PROGRESS NOTES
[facility] Medicine Progress Note  Date of Service: 11/23/2017    Assessment & Plan   Frances Cates is a 71 year old female with a PMH of type II diabetes and HTN who presented on 11/22/2017 for scheduled Procedure(s):  ARTHROPLASTY REVISION KNEE by Hal Rubin MD and is being followed by the hospital medicine service for co-management of acute and/or chronic perioperative medical problems.      S/p Procedure(s):  ARTHROPLASTY REVISION KNEE   1 Day Post-Op    - pain control, wound cares, physical therapy, occupational therapy and DVT prophylaxis per orthopedic surgery service      Essential hypertension, benign  Patient has a history of HTN. Blood pressures elevated after surgery, likely due to pain. Blood pressure today improved, but slightly elevated at 147/63.   - Continue PTA losartan-HCTZ  - Continue PTA amlodipine     Type 2 diabetes mellitus without complication (H)  Patient has a history of type 2 diabetes (not on insulin) which has been better controlled recently with blood sugars at home between 140-170. A1C on 11/23/17 is 6.6. Blood sugars in the hospital have been 171-237 in the past 24 hours.   - Continue PTA metformin  - Continue PTA glipizide    Fungal Rash  Patient has a rash on her abdomen under the pannus. Given location and erythematous appearance, likely fungal. States that she has used an antifungal in the past, which has helped.  - Miconazole powder BID to affected area.    DVT Prophylaxis: as per orthopedic surgery service - ASA 325mg daily  Code Status: Full Code    Lines: none   Stuart catheter: None    Discussion: Medically, the patient appears stable but does not feel comfortable going home today.     Disposition: Anticipate discharge tomorrow.     Attestation:  I have reviewed today's vital signs, notes, medications, labs and imaging.  Total time spent with patient: 15 minutes    I have discussed and formulated a plan for this patient  with Dr. Jordan Etienne.    Ana BOUCHER [facility]      Interval History   Patient states that she is feeling improved today, but is not ready to go home due to all the new medications. She has been ambulating with the walker and was able to use the stairs today. Denies HA, chest pain, palpitations, shortness of breath, abdominal pain, nausea, vomiting or diarrhea. Reports that she has not had a BM since the surgery, but has been drinking plenty of fluids, is eating fruit and drank some prune juice. Also reports redness beneath abdominal fold that is mildly itchy.    Physical Exam   Temp:  [96.7  F (35.9  C)-97.8  F (36.6  C)] 97.2  F (36.2  C)  Pulse:  [57-79] 57  Heart Rate:  [59-76] 76  Resp:  [16-18] 18  BP: ()/(56-95) 147/63  SpO2:  [88 %-98 %] 96 %    Weights:   Vitals:    11/22/17 0807 11/22/17 1426   Weight: 81.6 kg (180 lb) 87.2 kg (192 lb 3.9 oz)    Body mass index is 34.05 kg/(m^2).    General: Patient appears comfortable, sitting up in chair, alert and oriented.  CV: Regular rate, normal rhythm. Radial pulses 2+ bilaterally.  Respiratory: CTA bilaterally  GI: Soft, nontender  Skin: Warm and dry. Mild erythema beneath pannus.   Musculoskeletal: Distal extremities warm, well profused. No edema.     Data     Recent Labs  Lab 11/23/17  0635   HGB 11.7      POTASSIUM 3.9   CHLORIDE 104   CO2 27   BUN 13   CR 0.51*   ANIONGAP 7   EDWARDO 8.2*   *       Recent Labs  Lab 11/23/17  1106 11/23/17  0635 11/23/17  0627 11/23/17  0215 11/22/17  2138 11/22/17  1622 11/22/17  1259   GLC  --  187*  --   --   --   --   --    *  --  171* 190* 219* 225* 211*      Unresulted Labs Ordered in the Past 30 Days of this Admission     No orders found for last 61 day(s).         Imaging  Recent Results (from the past 24 hour(s))   XR Knee Port Right 1/2 Views    Narrative    XR KNEE PORT RT 1/2 VW 11/22/2017 1:11 PM    COMPARISON: 12/18/2015    HISTORY: Arthroplasty.      Impression     IMPRESSION: Postoperative changes of right total knee arthroplasty  revision with extended femoral and tibial components. Hardware appears  intact. No fractures are seen.    JUMA FLWOERS MD      I reviewed all new labs and imaging results over the last 24 hours.    Medications     lactated ringers Stopped (11/23/17 0657)       amLODIPine  10 mg Oral Daily     [START ON 11/24/2017] metFORMIN  1,000 mg Oral BID w/meals     insulin aspart  1-7 Units Subcutaneous TID AC     insulin aspart  1-5 Units Subcutaneous At Bedtime     sodium chloride (PF)  3 mL Intracatheter Q8H     acetaminophen  975 mg Oral Q8H     senna-docusate  1-2 tablet Oral BID     aspirin EC  325 mg Oral Daily     [START ON 11/24/2017] glipiZIDE (GLUCOTROL) tablet 5 mg  5 mg Oral BID AC     losartan  100 mg Oral Daily    And     hydrochlorothiazide  25 mg Oral Daily     I have discussed and formulated a plan for this patient with Dr. Jordan Etienne.    Ana Olguin PA-C  Hospitalist Service

## 2017-11-23 NOTE — PROGRESS NOTES
Pt's meyers was removed this AM at 0705, pt tolerated well, meyers tip intact. Pt had urge to urinate almost immediately upon removal, able to void 125 cc of clear yellow urine; denies any discomfort. Saline locked per order, tolerating and drinking fluids adequately.  Ambulated very well, needed minimal assistance. Ambulated using walker. Pt up in recliner at this time.

## 2017-11-23 NOTE — PLAN OF CARE
"Problem: Patient Care Overview  Goal: Plan of Care/Patient Progress Review  Outcome: Improving  Patient A&O, ambulated from chair to bed with assist of 1 and walker. Pain managed with PRN oxycodone, flexeril and ice. Currently utilizing cpm to 30 degrees flexion. CMS intact with no numbness or tingling. Dressing is c/d/i. Stuart draining cloudy urine. Consulted with on-call PA to see if we should order a ua/uc but she declined to have that ordered at this time. LR infusing at 100ml/hr. Capno on. Baby powder applied to folds in abdomen/groin, but requested prn nystatin powder be ordered for tomorrow. PCD on left leg. Blood sugars 225 & 219, covered appropriately with insulin. Clear lung sounds. Vs stable on room air. /81 (BP Location: Right arm)  Pulse 70  Temp 96.7  F (35.9  C) (Oral)  Resp 16  Ht 1.6 m (5' 3\")  Wt 87.2 kg (192 lb 3.9 oz)  SpO2 95%  BMI 34.05 kg/m2        "

## 2017-11-23 NOTE — PROGRESS NOTES
"[facility] Progress Note      Post-operative Day: 1 Day Post-Op    Procedure(s):  Revision Right Total Knee Arthroplasty - Wound Class: I-Clean      Subjective:    Pain: minimal  Chest pain, SOB:  No      Objective:  Blood pressure 147/63, pulse 57, temperature 97.2  F (36.2  C), temperature source Oral, resp. rate 18, height 1.6 m (5' 3\"), weight 87.2 kg (192 lb 3.9 oz), SpO2 96 %, not currently breastfeeding.    Patient Vitals for the past 24 hrs:   BP Temp Temp src Pulse Heart Rate Resp SpO2 Height Weight   11/23/17 0842 147/63 97.2  F (36.2  C) Oral 57 - 18 96 % - -   11/23/17 0340 149/74 97.4  F (36.3  C) Oral 64 - 18 96 % - -   11/22/17 2337 159/69 97.5  F (36.4  C) Oral 57 - 18 97 % - -   11/22/17 2059 174/81 - - - - - - - -   11/22/17 2043 (!) 197/95 - - - - - - - -   11/22/17 1934 181/83 96.7  F (35.9  C) Oral 70 - 16 95 % - -   11/22/17 1715 - - - - - - 98 % - -   11/22/17 1553 156/69 - - 79 - - 97 % - -   11/22/17 1500 192/83 - - 70 - 16 97 % - -   11/22/17 1426 161/82 96.8  F (36  C) Oral 77 - 16 96 % 1.6 m (5' 3\") 87.2 kg (192 lb 3.9 oz)   11/22/17 1416 - - - - - - 97 % - -   11/22/17 1400 129/86 97.8  F (36.6  C) - - 76 16 - - -   11/22/17 1351 - - - - - - 97 % - -   11/22/17 1350 - - - - - - (!) 88 % - -   11/22/17 1345 125/71 - - - 66 16 97 % - -   11/22/17 1339 - - - - - - 97 % - -   11/22/17 1338 - - - - - - 97 % - -   11/22/17 1330 134/83 97.6  F (36.4  C) Oral - 61 16 97 % - -   11/22/17 1315 128/69 - - - 61 16 97 % - -   11/22/17 1300 135/79 - - - 64 16 95 % - -   11/22/17 1248 111/56 - - - 60 16 96 % - -   11/22/17 1246 99/61 97.4  F (36.3  C) Oral - 59 16 96 % - -       Wt Readings from Last 4 Encounters:   11/22/17 87.2 kg (192 lb 3.9 oz)   12/15/16 79.9 kg (176 lb 3.2 oz)   03/21/16 82.4 kg (181 lb 9.6 oz)   09/10/15 81.8 kg (180 lb 6.4 oz)         Motor function, sensation, and circulation intact   Yes  Wound status: incisions are clean dry and intact. Yes  Calf tenderness: " Bilateral  No    Pertinent Labs   Lab Results: personally reviewed.     Recent Labs   Lab Test  11/23/17   0635  01/12/17   0828  12/15/16   0857  03/21/16   1449  09/10/15   0824   02/14/13   0919  02/03/13 2020   HGB  11.7   --    --   14.4   --    --   14.0  14.8   HCT   --    --    --   42.3   --    --   40.4  44.3   MCV   --    --    --   86   --    --   84  85   PLT   --    --    --   241   --    --   271  311   NA  138   --   133   --   138   < >   --   140   CRP   --   <2.9   --    --    --    --    --    --     < > = values in this interval not displayed.       Plan: Anticoagulation protocol:  mg daily  x 42  days            Pain medications:  oxycodone, tylenol and vistaril            Weight bearing status:  WBAT            Disposition:  Home possibly this evening             Continue cares and rehabilitation     Report completed by:  Darline Durán PA-C  Date: 11/23/2017  Time: 9:14 AM

## 2017-11-23 NOTE — OP NOTE
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      PROCEDURE:  Revision right total knee arthroplasty.      SURGEON:  Hal Rubin MD      ASSISTANT:  Claudia Shi PA-C  The presence of a PA was necessary for position, retraction and safe progression of the case.      ANESTHESIA:  Spinal.      ESTIMATED BLOOD LOSS:  50 mL.      COMPLICATIONS:  None apparent.      DISPOSITION:  Stable to PACU.      TOURNIQUET TIME:  108 minutes right lower extremity.      IMPLANTS USED:  DePuy Sigma revision total knee arthroplasty with 75 mm x 14 mm press-fit tibial stem with 37 mm tibial metaphyseal sleeve, size 2.5 revision MBT tray, 12.5mm by a size 3 RP polyethylene, 75 mm x 14 mm press-fit femoral stem with 31 mm metaphyseal sleeve and size 3 femoral component with an 8 mm distal lateral augment.      INTRAOPERATIVE FINDINGS:  No gross purulence.  There was diffuse synovitis.  There was significant osteolysis under the entire medial tibial plateau with an uncontained bone defect.  This came anteriorly as well.  The lateral plateau and posterior plateau appeared to be in good shape.  There was osteolysis of the lateral distal femur.  She had a femoral ingrowth component which was not grossly loose.  There was micromotion of the tibia but it wasn't grossly loose     INDICATIONS:  Frances Cates is a 71-year-old female who underwent a right total knee arthroplasty about 25 years ago.  She had done well.  However, she had a fall about a year and a half ago and since then has had progressively worsening medial-sided right knee pain.  Initially, we felt this was a soft tissue injury and treated it nonoperatively.  However, she developed a large lucency under the tibial component.  Workup for infection was negative.  She had a bone scan which showed  increased activity near her right femoral component as well as her right tibia.  We therefore felt her knee arthroplasty was loose.  After discussing treatment options, she elected to undergo revision right total knee to optimize function.  She understood the risks of infection, ongoing pain, stiffness, and the risks inherent to anesthesia.  She elected to proceed.      DESCRIPTION OF PROCEDURE:  Patient was met in the preoperative holding area where the right knee was marked.  Consent was reviewed.  She was then transferred to the operating theater.  After smooth induction of spinal anesthetic, she was positioned supine with a bump under her right hip.  A timeout was performed, verifying the correct patient, surgery, location.  She received preoperative antibiotics.  Her right lower extremity was then prepped and draped in standard sterile fashion.  We reincised her previously made longitudinal incision which was curvilinear and went around the medial border of the patella.  Dissection was sharply carried down through skin and subcutaneous tissue.  Previous arthrotomy was well-healed and the remaining Ethibond sutures in place.  We therefore went through this previous arthrotomy.  Actually came somewhat lateral across her quad tendon. We then exposed the joint.  There was no significant effusion or gross purulence.  We performed a wide synovectomy of both her medial and lateral gutters as well as suprapatellar pouch.  We then removed the polyethylene with an osteotome.  Following this, we used the offset osteotome as well as flexible osteotome to remove the press-fit femur.  As stated, there was osteolysis of the lateral distal femur.  The remainder of the femur was removed with minimal bone loss.  Once we had this femur removed we turned our attention to the tibia.  After hyperflexing and exposing the tibia, there was noted there was significant osteolysis and bone loss under the medial plateau.  We used stacked  osteotomes to remove the tibial component with essentially no bone loss.  We then used osteotomes and reverse curets to remove the remainder of the cement as well as fibrous tissue from the lysis from her tibia.  After this had been done, the knee was thoroughly irrigated.      We then started reaming for the tibia, ultimately getting to 14 mm reamer for a 75 mm stem.  After this we sequentially broached and got to a 37 mm broach.  We had good press-fit and was rotationally stable.  We sized the tibia to a size 2.5 and placed a tibial trial.  The 2.5 baseplate had good coverage over her proximal tibia. We then turned attention to the femur.  We used a drill to enter the medullary canal.  We then reamed for a 14 mm x 75 mm femoral stem.  Following this, we broached with 31 broach.  We had good press-fit with rotational stability.  We then used an intramedullary guide to freshen up our distal femoral cut.  Took an additional 2 mm off the medial femoral condyle.  Laterally, there was an air ball, so we freshened up for an 8 mm augment which took 1-2 mm of bone given the osteolysis.  We then refreshed our anterior and posterior cuts as well as our chamfer cuts.  We carefully set the rotation of the 4 in 1 cutting block to be perpendicular to the tibial component.  After freshening up the 4-in-1 cuts, we cut for a femoral box and femoral component.  We then removed the intramedullary guide and placed our femoral trial with a stem, sleeve, and size 3 femur.  With a 12.5 mm poly trial, we were able to obtain full extension and flexion to about 115 degrees.  There was minimal laxity with valgus stress and no laxity with varus stress.  It was stable at 90 degrees of flexion.  Patella tracked well.  Was able to flex to about 110 degrees with gravity flexion.  We were happy with this.  We then evaluated the patellar component and noted this was not loose at all and there was no evidence of polyethylene wear.  We therefore  removed all trial components.  The knee was thoroughly irrigated.  Final components were assembled on the back table.  We started with the tibia.  We took care not to get any cement around the ingrowth portion of the metaphyseal sleeve.  This was press fit in, although we did put cement proximally especially medially where she had the bone defect.  We also placed some bone autograft in there from her box cut on the femur.  After this, we cemented the femoral stem so we replaced our final femoral component.  Again, we took care not to get any cement around the ingrowth portion of the femoral sleeve.  We then placed a 12.5 mm polyethylene and placed the knee into full extension while the cement was curing.  All excess cement was removed.  The wound was soaked in dilute Betadine solution.  Once the cement was fully hardened, we trialed with a 12.5 mm, which was able to get full extension, 110 degrees of flexion with gravity and was stable to varus and valgus stress.  Felt we had appropriate tension and flexion.  The patella tracked well.  Happy with this, we removed our 12.5 mm poly and placed our final 12.5 mm component.  The wound was again thoroughly irrigated.  The capsular layer was closed with #1 Vicryl and a running Stratafix.  Proximally, it was reinforced with Ethibond as well.  The subcutaneous layer was closed with 2-0 Vicryl and skin with staples.  A sterile compressive dressing was applied and patient was awoken from anesthesia and transferred to the PACU in stable condition.      POSTOPERATIVE PLAN:   1.  Weightbearing as tolerated, right lower extremity with PT for mobilization.   2.  Deep venous thrombosis prophylaxis:  Aspirin 325 mg daily.   3.  Perioperative antibiotics.   4.  Return to clinic in 2 weeks for wound check, sooner if questions or concerns.         FABIOLA SALCEDO MD             D: 11/22/2017 12:48   T: 11/22/2017 20:24   MT: [id number]      Name:     SARAH WELLS   MRN:       [id number]        Account:        [id number]   :      1945           Procedure Date: 2017      Document: [id number]

## 2017-11-24 ENCOUNTER — APPOINTMENT (OUTPATIENT)
Dept: PHYSICAL THERAPY | Facility: CLINIC | Age: 72
DRG: 468 | End: 2017-11-24
Attending: ORTHOPAEDIC SURGERY
Payer: OTHER MISCELLANEOUS

## 2017-11-24 VITALS
TEMPERATURE: 97.4 F | SYSTOLIC BLOOD PRESSURE: 125 MMHG | DIASTOLIC BLOOD PRESSURE: 65 MMHG | WEIGHT: 192.24 LBS | RESPIRATION RATE: 16 BRPM | OXYGEN SATURATION: 96 % | HEIGHT: 63 IN | BODY MASS INDEX: 34.06 KG/M2 | HEART RATE: 52 BPM

## 2017-11-24 LAB
GLUCOSE BLDC GLUCOMTR-MCNC: 136 MG/DL (ref 70–99)
GLUCOSE BLDC GLUCOMTR-MCNC: 199 MG/DL (ref 70–99)
GLUCOSE BLDC GLUCOMTR-MCNC: 217 MG/DL (ref 70–99)
HGB BLD-MCNC: 11.2 G/DL (ref 11.7–15.7)

## 2017-11-24 PROCEDURE — 36415 COLL VENOUS BLD VENIPUNCTURE: CPT | Performed by: ORTHOPAEDIC SURGERY

## 2017-11-24 PROCEDURE — 85018 HEMOGLOBIN: CPT | Performed by: ORTHOPAEDIC SURGERY

## 2017-11-24 PROCEDURE — 97116 GAIT TRAINING THERAPY: CPT | Mod: GP | Performed by: PHYSICAL THERAPIST

## 2017-11-24 PROCEDURE — 00000146 ZZHCL STATISTIC GLUCOSE BY METER IP

## 2017-11-24 PROCEDURE — 40000193 ZZH STATISTIC PT WARD VISIT: Performed by: PHYSICAL THERAPIST

## 2017-11-24 PROCEDURE — 25000132 ZZH RX MED GY IP 250 OP 250 PS 637: Performed by: ORTHOPAEDIC SURGERY

## 2017-11-24 PROCEDURE — 25000132 ZZH RX MED GY IP 250 OP 250 PS 637: Performed by: PHYSICIAN ASSISTANT

## 2017-11-24 PROCEDURE — 99231 SBSQ HOSP IP/OBS SF/LOW 25: CPT | Performed by: PHYSICIAN ASSISTANT

## 2017-11-24 PROCEDURE — 97110 THERAPEUTIC EXERCISES: CPT | Mod: GP | Performed by: PHYSICAL THERAPIST

## 2017-11-24 RX ADMIN — GLIPIZIDE 5 MG: 5 TABLET ORAL at 06:31

## 2017-11-24 RX ADMIN — HYDROXYZINE HYDROCHLORIDE 25 MG: 25 TABLET ORAL at 06:31

## 2017-11-24 RX ADMIN — HYDROCHLOROTHIAZIDE 25 MG: 12.5 CAPSULE ORAL at 07:45

## 2017-11-24 RX ADMIN — ASPIRIN 325 MG: 325 TABLET, COATED ORAL at 07:45

## 2017-11-24 RX ADMIN — LOSARTAN POTASSIUM 100 MG: 50 TABLET, FILM COATED ORAL at 07:45

## 2017-11-24 RX ADMIN — MICONAZOLE NITRATE: 2 POWDER TOPICAL at 07:49

## 2017-11-24 RX ADMIN — OXYCODONE HYDROCHLORIDE 10 MG: 5 TABLET ORAL at 01:34

## 2017-11-24 RX ADMIN — INSULIN ASPART 2 UNITS: 100 INJECTION, SOLUTION INTRAVENOUS; SUBCUTANEOUS at 08:23

## 2017-11-24 RX ADMIN — OXYCODONE HYDROCHLORIDE 10 MG: 5 TABLET ORAL at 11:03

## 2017-11-24 RX ADMIN — OXYCODONE HYDROCHLORIDE 10 MG: 5 TABLET ORAL at 05:47

## 2017-11-24 RX ADMIN — AMLODIPINE BESYLATE 10 MG: 10 TABLET ORAL at 07:45

## 2017-11-24 RX ADMIN — METFORMIN HYDROCHLORIDE 1000 MG: 500 TABLET, EXTENDED RELEASE ORAL at 07:45

## 2017-11-24 RX ADMIN — ACETAMINOPHEN 975 MG: 325 TABLET, FILM COATED ORAL at 06:33

## 2017-11-24 NOTE — PROGRESS NOTES
Name: Frances Cates    MRN#: [id number]    Reason for Hospitalization: aseptic loosening  S/P total knee arthroplasty    Discharge Date: 11/24/2017    Patient / Family response to discharge plan: in agreement    Follow-Up Appt: No future appointments.    Other Providers (Care Coordinator, County Services, PCA services etc): No    Discharge Disposition: home with [facility] ([phone] Fax: [phone]) for P/T.    KELL Ly  [facility] [phone]/ [facility] [phone]

## 2017-11-24 NOTE — PROGRESS NOTES
[facility] Medicine Progress Note  Date of Service: 11/24/2017    Assessment & Plan   Frances Cates is a 71 year old female who presented on 11/22/2017 for scheduled Procedure(s):  ARTHROPLASTY REVISION KNEE by Hal Rubin MD and is being followed by the hospital medicine service for co-management of acute and/or chronic perioperative medical problems.      S/p Procedure(s):  ARTHROPLASTY REVISION KNEE   2 Days Post-Op    - pain control, wound cares, physical therapy, occupational therapy and DVT prophylaxis per orthopedic surgery service     Essential hypertension, benign  Patient has a history of HTN. Blood pressures elevated after surgery, likely due to pain. Blood pressure have been normotensive.  - Continue PTA losartan-HCTZ  - Continue PTA amlodipine      Type 2 diabetes mellitus without complication (H)  Patient has a history of type 2 diabetes (not on insulin) which has been better controlled recently with blood sugars at home between 140-170. A1C on 11/23/17 is 6.6. Blood sugars 199-240 overnight. Improving this morning at 136 will discharge on home regimen.  - Continue PTA metformin  - Continue PTA glipizide  - Monitor for signs of hypoglycemia     Fungal Rash  Patient has a rash on her abdomen under the pannus. Given location and erythematous appearance, likely fungal. States that she has used an antifungal in the past, which has helped. Improving.  - Continue Miconazole powder BID as needed to affected area at home.     DVT Prophylaxis: as per orthopedic surgery service - ASA 325mg daily  Code Status: Full Code    Lines: None   Stuart catheter: None    Discussion: Medically, the patient appears stable for discharge.     Disposition: Anticipate discharge today per ortho service.     Attestation:  I have reviewed today's vital signs, notes, medications, labs and imaging.  Face-to-face time: 15 minutes     I have discussed this patient and formulated a  plan with Dr. Jordan Etienne.    Ana Olguin PA-C    Interval History   Patient states that she is feeling improved today and has no complaints. Denies HA, chest pain, palpitations, shortness of breath, abdominal pain, N/V/D or fevers/chills.    Physical Exam   Temp:  [96.8  F (36  C)-97.8  F (36.6  C)] 97.4  F (36.3  C)  Pulse:  [52-57] 52  Heart Rate:  [61-66] 61  Resp:  [16-18] 16  BP: (125-156)/(63-85) 125/65  SpO2:  [95 %-96 %] 96 %    Weights:   Vitals:    11/22/17 0807 11/22/17 1426   Weight: 81.6 kg (180 lb) 87.2 kg (192 lb 3.9 oz)    Body mass index is 34.05 kg/(m^2).    General: Patient using walker to transfer to chair, alert, oriented, appears well.  CV: Regular rate, normal rhythm.  Respiratory: CTA bilaterally. Bilateral lower extremity edema, to knee on left, unable to assess on right secondary to bandage.  GI: Soft, nontender.  Skin: Warm and dry. Bandage on right knee.  Musculoskeletal: Normal bulk and tone. Moving all 4 extremities appropriately.    Data     Recent Labs  Lab 11/24/17  0653 11/23/17  0635   HGB 11.2* 11.7   NA  --  138   POTASSIUM  --  3.9   CHLORIDE  --  104   CO2  --  27   BUN  --  13   CR  --  0.51*   ANIONGAP  --  7   EDWARDO  --  8.2*   GLC  --  187*         Recent Labs  Lab 11/24/17  0646 11/24/17  0133 11/23/17  2124 11/23/17  1620 11/23/17  1106 11/23/17  0635 11/23/17  0627   GLC  --   --   --   --   --  187*  --    * 217* 240* 203* 237*  --  171*        Unresulted Labs Ordered in the Past 30 Days of this Admission     No orders found from 9/23/2017 to 11/23/2017.           Imaging  No results found for this or any previous visit (from the past 24 hour(s)).     I reviewed all new labs and imaging results over the last 24 hours.    Medications        miconazole   Topical BID     amLODIPine  10 mg Oral Daily     metFORMIN  1,000 mg Oral BID w/meals     insulin aspart  1-7 Units Subcutaneous TID AC     insulin aspart  1-5 Units Subcutaneous At Bedtime     sodium  chloride (PF)  3 mL Intracatheter Q8H     acetaminophen  975 mg Oral Q8H     senna-docusate  1-2 tablet Oral BID     aspirin EC  325 mg Oral Daily     glipiZIDE (GLUCOTROL) tablet 5 mg  5 mg Oral BID AC     losartan  100 mg Oral Daily    And     hydrochlorothiazide  25 mg Oral Daily   Reviewed home medications for discharge.     I have discussed this patient and formulated a plan with Dr. Jordan Etienne.    Ana Olguin PA-C

## 2017-11-24 NOTE — PROGRESS NOTES
11/23/17 0800   Quick Adds   Type of Visit Initial PT Evaluation-   Completed by Mattie Ramos DPT 11/23/2017   Living Environment   Lives With child(guy), adult   Living Arrangements house   Home Accessibility bed and bath on same level;stairs (2 railings present);stairs within home   Number of Stairs to Enter Home 1   Number of Stairs Within Home 8   Stair Railings at Home none;inside, present at both sides   Transportation Available car;family or friend will provide   Living Environment Comment Lives with dtr but home alone during day.  Works fulltime a [employer] and goal to return to work.   Self-Care   Dominant Hand right   Usual Activity Tolerance good   Current Activity Tolerance good   Regular Exercise no   Activity/Exercise/Self-Care Comment Walks 20 mmutes at work every day    Functional Level Prior   Ambulation 0-->independent   Transferring 0-->independent   Toileting 0-->independent   Bathing 0-->independent   Dressing 0-->independent   Eating 0-->independent   Communication 0-->understands/communicates without difficulty   Swallowing 0-->swallows foods/liquids without difficulty   Cognition 0 - no cognition issues reported   Fall history within last six months no   Which of the above functional risks had a recent onset or change? none   General Information   Onset of Illness/Injury or Date of Surgery - Date 11/22/17   Referring Physician Dr Rubin   Patient/Family Goals Statement Return to home with dtr and home care   Pertinent History of Current Problem (include personal factors and/or comorbidities that impact the POC) Knee revisions   Precautions/Limitations fall precautions   Weight-Bearing Status - LUE full weight-bearing   Weight-Bearing Status - RUE full weight-bearing   Weight-Bearing Status - LLE full weight-bearing   Weight-Bearing Status - RLE weight-bearing as tolerated   Cognitive Status Examination   Orientation orientation to person, place and time   Level of  "Consciousness alert   Follows Commands and Answers Questions 100% of the time   Personal Safety and Judgment intact   Memory intact   Pain Assessment   Patient Currently in Pain (At rest 4-5/10)   Range of Motion (ROM)   ROM Comment right hip AROM 75, knee 15-65 degrees pain before end ROM   Strength   Strength Comments 2/5 hip FF, Knee flexion and extension, QS 2/5   Bed Mobility   Bed Mobility Comments Minimal supine to sit and roll, scoot   Transfer Skills   Transfer Comments RW sit to stand RW bed to chair CGA   Gait   Gait Comments RW WBAT right   Balance   Balance Comments No reported or observed concerns   General Therapy Interventions   Planned Therapy Interventions bed mobility training;gait training;ROM;strengthening;stretching;transfer training;risk factor education;home program guidelines;progressive activity/exercise   Clinical Impression   Criteria for Skilled Therapeutic Intervention yes, treatment indicated   PT Diagnosis Dr Rubin   Influenced by the following impairments Pain, TKA revision   Functional limitations due to impairments Acute s/p day one post op   Clinical Presentation Evolving/Changing   Clinical Presentation Rationale Progressing and pain improving   Clinical Decision Making (Complexity) Moderate complexity   Therapy Frequency` 2 times/day   Predicted Duration of Therapy Intervention (days/wks) 3 days   Anticipated Discharge Disposition Home with Assist;Home with Home Therapy   Risk & Benefits of therapy have been explained Yes   Patient, Family & other staff in agreement with plan of care Yes   Clinical Impression Comments CPM requested and ordered   [facility] AM-PAC  \"6 Clicks\" V.2 Basic Mobility Inpatient Short Form   1. Turning from your back to your side while in a flat bed without using bedrails? 3 - A Little   2. Moving from lying on your back to sitting on the side of a flat bed without using bedrails? 3 - A Little   3. Moving to and from a bed to a chair (including " a wheelchair)? 3 - A Little   4. Standing up from a chair using your arms (e.g., wheelchair, or bedside chair)? 3 - A Little   5. To walk in hospital room? 3 - A Little   6. Climbing 3-5 steps with a railing? 3 - A Little   Basic Mobility Raw Score (Score out of 24.Lower scores equate to lower levels of function) 18   Total Evaluation Time   Total Evaluation Time (Minutes) 30

## 2017-11-24 NOTE — PLAN OF CARE
Problem: Patient Care Overview  Goal: Plan of Care/Patient Progress Review  Outcome: Adequate for Discharge Date Met: 11/24/17  Pt up in room independently with walker. Moving well with steady gait.  Pain controlled with ice to incision and medications as ordered. No problems voiding, had BM today.   [place] NSG DISCHARGE NOTE      Patient discharged to home at 12:28 PM via wheel chair. Accompanied by staff. Discharge instructions reviewed with patient, opportunity offered to ask questions. Prescriptions sent to patients preferred pharmacy. All belongings sent with patient.      Luciana Norman

## 2017-11-24 NOTE — PROGRESS NOTES
"[facility] Orthopaedics Progress Note      Post-operative Day: 2 Days Post-Op    Procedure(s):  Revision Right Total Knee Arthroplasty - Wound Class: I-Clean      Subjective:  Pt feels well and ready to go home as planned today.    Pain: minimal  Chest pain, SOB:  No      Objective:  Blood pressure 125/65, pulse 52, temperature 97.4  F (36.3  C), temperature source Oral, resp. rate 16, height 1.6 m (5' 3\"), weight 87.2 kg (192 lb 3.9 oz), SpO2 96 %, not currently breastfeeding.    Patient Vitals for the past 24 hrs:   BP Temp Temp src Pulse Heart Rate Resp SpO2   11/24/17 0741 125/65 97.4  F (36.3  C) Oral 52 - 16 96 %   11/23/17 2230 143/68 96.8  F (36  C) Oral - 61 16 96 %   11/23/17 1904 156/85 97.7  F (36.5  C) Oral - 66 16 96 %   11/23/17 1554 148/63 97.4  F (36.3  C) Oral - 64 18 95 %       Wt Readings from Last 4 Encounters:   11/22/17 87.2 kg (192 lb 3.9 oz)   12/15/16 79.9 kg (176 lb 3.2 oz)   03/21/16 82.4 kg (181 lb 9.6 oz)   09/10/15 81.8 kg (180 lb 6.4 oz)         Motor function, sensation, and circulation intact   Yes  Wound status: incisions are clean dry and intact. Yes  Calf tenderness: Bilateral  No    Pertinent Labs   Lab Results: personally reviewed.     Recent Labs   Lab Test  11/24/17   0653  11/23/17   0635  01/12/17   0828  12/15/16   0857  03/21/16   1449  09/10/15   0824   02/14/13   0919  02/03/13 2020   HGB  11.2*  11.7   --    --   14.4   --    --   14.0  14.8   HCT   --    --    --    --   42.3   --    --   40.4  44.3   MCV   --    --    --    --   86   --    --   84  85   PLT   --    --    --    --   241   --    --   271  311   NA   --   138   --   133   --   138   < >   --   140   CRP   --    --   <2.9   --    --    --    --    --    --     < > = values in this interval not displayed.       Plan: Anticoagulation protocol:  mg daily  x 42  Days, then resume 81 mg daily            Pain medications:  oxycodone            Weight bearing status:  WBAT            Disposition:  " Home with CPM             Continue cares and rehabilitation     Report completed by:  Donte Purcell MD  Date: 11/24/2017  Time: 12:49 PM

## 2017-11-24 NOTE — PLAN OF CARE
Problem: Patient Care Overview  Goal: Plan of Care/Patient Progress Review  Physical Therapy Discharge Summary    Reason for therapy discharge:    Discharged to home with home therapy.    Progress towards therapy goal(s). See goals on Care Plan in [facility] electronic health record for goal details.  Goals met   Goals met. Pt indep. W/ bed mobility, transfers and gait able to amb.125 feet x2 with Rw, SBA to indep including up/ down steps w/ use of railing and SEC, without difficulty. SEC issued by [employer].   Discussed recommendation that pt continue gait w/ walker- tends to ditch walker and amb w/ analgic gait. Walker ht  Adjusted and wheels set in to allow more room at home .   HEP issued- AROM 12-70 degrees       Therapy recommendation(s):    Continued therapy is recommended.  Rationale/Recommendations:  home care PT to progress ROM/s trengthening and gait activities .     Tracey Keane, PT

## 2017-11-30 ENCOUNTER — CARE COORDINATION (OUTPATIENT)
Dept: CARE COORDINATION | Facility: CLINIC | Age: 72
End: 2017-11-30

## 2017-11-30 NOTE — PROGRESS NOTES
Non-[place] PCP.  No Care Coordination follow up at this time.     Zafar Wright RN  Clinic Care Coordinator  [phone] or [phone]

## 2017-12-18 ENCOUNTER — HOSPITAL ENCOUNTER (OUTPATIENT)
Dept: PHYSICAL THERAPY | Facility: CLINIC | Age: 72
Setting detail: THERAPIES SERIES
End: 2017-12-18
Attending: ORTHOPAEDIC SURGERY
Payer: OTHER MISCELLANEOUS

## 2017-12-18 PROCEDURE — 40000718 ZZHC STATISTIC PT DEPARTMENT ORTHO VISIT: Performed by: PHYSICAL THERAPIST

## 2017-12-18 PROCEDURE — 97110 THERAPEUTIC EXERCISES: CPT | Mod: GP | Performed by: PHYSICAL THERAPIST

## 2017-12-18 PROCEDURE — 97161 PT EVAL LOW COMPLEX 20 MIN: CPT | Mod: GP | Performed by: PHYSICAL THERAPIST

## 2017-12-18 NOTE — PROGRESS NOTES
Frances Cates  1945 Physical Therapy Initial Evaluation  12/18/17 1400   General Information   Type of Visit Initial OP Ortho PT Evaluation   Start of Care Date 12/18/17   Referring Physician Hal Rubin   Patient/Family Goals Statement Walk without pain   Orders Evaluate and Treat   Date of Order 12/08/17  (Date of Order)   Insurance Type Other  (WC)   Insurance Comments/Visits Authorized 1   Medical Diagnosis S/P Revision right total knee arthroplasty   Surgical/Medical history reviewed Yes   Precautions/Limitations no known precautions/limitations   Body Part(s)   Body Part(s) Knee   Presentation and Etiology   Pertinent history of current problem (include personal factors and/or comorbidities that impact the POC) Surgery on November 22, 2017. Had a fall on 12/18/2015 and landed on right knee. Had a revision about 1 month ago. Had home care and had PT for last few weeks. Had a CPM at home. Has been icing and elevating knee. Has been doing exercises most days at home. Has been having some nausea. / Comorbidities - Diabetes mellitus type 2, Intermittent asthma, Atypical chest pain     Impairments A. Pain;C. Swelling;E. Decreased flexibility   Functional Limitations perform activities of daily living;perform required work activities;perform desired leisure / sports activities   Symptom Location Right knee   How/Where did it occur With a fall;At work   Onset date of current episode/exacerbation 12/18/15   Chronicity Chronic   Pain rating (0-10 point scale) Best (/10);Worst (/10)   Best (/10) 1   Worst (/10) 7   Pain quality B. Dull;C. Aching   Frequency of pain/symptoms B. Intermittent   Pain/symptoms exacerbated by M. Other   Pain exacerbation comment Exercises / Bending machine   Pain/symptoms eased by E. Changing positions;I. OTC medication(s);K. Other   Pain eased by comment Prescription   Progression of symptoms since onset: Improved   Prior Level of Function   Functional Level Prior Comment Ind    Current Level of Function   Current Community Support Family/friend caregiver   Patient role/employment history A. Employed   Employment Comments Drug and alcohol counselor   [facility]   Home/community accessibility 8 stairs with rail   Current equipment-Gait/Locomotion Standard cane   Fall Risk Screen   Fall screen completed by PT   Have you fallen 2 or more times in the past year? No   Have you fallen and had an injury in the past year? No   Is patient a fall risk? No   Knee Objective Findings   Side (if bilateral, select both right and left) Right   Gait/Locomotion Ambulates with cane and good balance. Minimal heel strike and toe off. / Stair ascent with handrail assist and reciprical gait pattern. Stair descent with step-to gait pattern and handrail asist.    Right Knee Flexion Strength <3/5 due to ROM restrictions   Right Knee Extension Strength <3/5 due to ROM restrictions   Right Hip Abduction Strength 4-/5 B   Right Hamstring Flexibility Moderately restricted   Right Knee Extension PROM 21 / Left - 0   Right Knee Flexion PROM 100 / Left - 125   Right Quadricep Flexibility Moderately restricted   Integumentary  Incision site no signs of infection noted / Moderate to severe amount of swelling   Planned Therapy Interventions   Planned Therapy Interventions gait training;manual therapy;joint mobilization;neuromuscular re-education;ROM;strengthening;stretching   Planned Modality Interventions   Planned Modality Interventions Cryotherapy;Hot packs   Clinical Impression   Criteria for Skilled Therapeutic Interventions Met yes, treatment indicated   PT Diagnosis Right TKA leading to ROM and strength deficits leading to difficulty with functional activities   Influenced by the following impairments Pain, ROM deficits, strength deficits   Functional limitations due to impairments Difficulty walking, stair climbing   Clinical Presentation Stable/Uncomplicated   Clinical Presentation Rationale  Several comorbidities impacting PT / 1 body system / Stable   Clinical Decision Making (Complexity) Low complexity   Therapy Frequency (1-2 times / week)   Predicted Duration of Therapy Intervention (days/wks) 6 weeks   Risk & Benefits of therapy have been explained Yes   Patient, Family & other staff in agreement with plan of care Yes   Education Assessment   Preferred Learning Style Listening;Reading;Demonstration;Pictures/video   Barriers to Learning No barriers   ORTHO GOALS   PT Ortho Eval Goals 1;2;3;4   Ortho Goal 1   Goal Identifier HEP   Goal Description Pt will be independent in HEP in order to achieve long term treatment goals.   Target Date 01/15/18   Ortho Goal 2   Goal Identifier Stairs   Goal Description Pt will be able to ascend and descend 8 steps with a handrail assist and a reciprical gait pattern with minimal difficulty.   Target Date 02/12/18   Ortho Goal 3   Goal Identifier Walking   Goal Description Pt will be able to ambulate without an assistive device safely.   Target Date 02/12/18   Total Evaluation Time   Total Evaluation Time 15     Osmin Rice, PT, DPT

## 2017-12-21 ENCOUNTER — HOSPITAL ENCOUNTER (OUTPATIENT)
Dept: PHYSICAL THERAPY | Facility: CLINIC | Age: 72
Setting detail: THERAPIES SERIES
End: 2017-12-21
Attending: ORTHOPAEDIC SURGERY
Payer: OTHER MISCELLANEOUS

## 2017-12-21 PROCEDURE — 40000718 ZZHC STATISTIC PT DEPARTMENT ORTHO VISIT: Performed by: PHYSICAL THERAPIST

## 2017-12-21 PROCEDURE — 97110 THERAPEUTIC EXERCISES: CPT | Mod: GP | Performed by: PHYSICAL THERAPIST

## 2017-12-26 ENCOUNTER — HOSPITAL ENCOUNTER (OUTPATIENT)
Dept: PHYSICAL THERAPY | Facility: CLINIC | Age: 72
Setting detail: THERAPIES SERIES
End: 2017-12-26
Attending: ORTHOPAEDIC SURGERY
Payer: OTHER MISCELLANEOUS

## 2017-12-26 PROCEDURE — 40000718 ZZHC STATISTIC PT DEPARTMENT ORTHO VISIT: Performed by: PHYSICAL THERAPIST

## 2017-12-26 PROCEDURE — 97110 THERAPEUTIC EXERCISES: CPT | Mod: GP | Performed by: PHYSICAL THERAPIST

## 2017-12-28 ENCOUNTER — HOSPITAL ENCOUNTER (OUTPATIENT)
Dept: PHYSICAL THERAPY | Facility: CLINIC | Age: 72
Setting detail: THERAPIES SERIES
End: 2017-12-28
Attending: ORTHOPAEDIC SURGERY
Payer: OTHER MISCELLANEOUS

## 2017-12-28 PROCEDURE — 40000718 ZZHC STATISTIC PT DEPARTMENT ORTHO VISIT: Performed by: PHYSICAL THERAPIST

## 2017-12-28 PROCEDURE — 97110 THERAPEUTIC EXERCISES: CPT | Mod: GP | Performed by: PHYSICAL THERAPIST

## 2017-12-28 PROCEDURE — 97116 GAIT TRAINING THERAPY: CPT | Mod: GP | Performed by: PHYSICAL THERAPIST

## 2018-01-02 ENCOUNTER — HOSPITAL ENCOUNTER (OUTPATIENT)
Dept: PHYSICAL THERAPY | Facility: CLINIC | Age: 73
Setting detail: THERAPIES SERIES
End: 2018-01-02
Attending: ORTHOPAEDIC SURGERY
Payer: OTHER MISCELLANEOUS

## 2018-01-02 PROCEDURE — 40000718 ZZHC STATISTIC PT DEPARTMENT ORTHO VISIT: Performed by: PHYSICAL THERAPIST

## 2018-01-02 PROCEDURE — 97110 THERAPEUTIC EXERCISES: CPT | Mod: GP | Performed by: PHYSICAL THERAPIST

## 2018-01-04 ENCOUNTER — HOSPITAL ENCOUNTER (OUTPATIENT)
Dept: PHYSICAL THERAPY | Facility: CLINIC | Age: 73
Setting detail: THERAPIES SERIES
End: 2018-01-04
Attending: INTERNAL MEDICINE
Payer: OTHER MISCELLANEOUS

## 2018-01-04 PROCEDURE — 97110 THERAPEUTIC EXERCISES: CPT | Mod: GP | Performed by: PHYSICAL THERAPIST

## 2018-01-04 PROCEDURE — 40000718 ZZHC STATISTIC PT DEPARTMENT ORTHO VISIT: Performed by: PHYSICAL THERAPIST

## 2018-01-10 ENCOUNTER — HOSPITAL ENCOUNTER (OUTPATIENT)
Dept: PHYSICAL THERAPY | Facility: CLINIC | Age: 73
Setting detail: THERAPIES SERIES
End: 2018-01-10
Attending: ORTHOPAEDIC SURGERY
Payer: OTHER MISCELLANEOUS

## 2018-01-10 PROCEDURE — 97110 THERAPEUTIC EXERCISES: CPT | Mod: GP | Performed by: PHYSICAL THERAPIST

## 2018-01-10 PROCEDURE — 40000718 ZZHC STATISTIC PT DEPARTMENT ORTHO VISIT: Performed by: PHYSICAL THERAPIST

## 2018-01-10 PROCEDURE — 97116 GAIT TRAINING THERAPY: CPT | Mod: GP | Performed by: PHYSICAL THERAPIST

## 2018-03-21 ENCOUNTER — DOCUMENTATION ONLY (OUTPATIENT)
Dept: PHYSICAL THERAPY | Facility: CLINIC | Age: 73
End: 2018-03-21

## 2018-03-21 NOTE — PROGRESS NOTES
Outpatient Physical Therapy Discharge Note     Patient: Frances Cates  : 1945    Beginning/End Dates of Reporting Period:  2018 to 1/10/2018 (Total of 7 visits)    Referring Provider: Hal Rubin    Diagnosis: S/P revision right total knee arthroplasty     Client Self Report:  (From date of last visit)  Saw surgeon and was told  that things looked  good. Will see surgeon  in 1 year. Went back to  work this week and  things are sore tdoay.  Sore at work because  doesn't get a chance to  walk as much.     Objective Measurements: (From date of last visit)  PROM -     Observation - Increased swelling in R LE with pitting edema in ankle    Treatment Has Consisted Of:  ROM and strengthening exercise instruction / Pt education regarding diagnosis / Gait training    Goals:  Pt will be independent  in HEP in order to  achieve long term treat-  ment goals. GOAL MET    Pt will be able to  ascend and descend 8  steps with a handrail  assist and a reciprical  gait pattern with minim-  al difficulty. GOAL NOT MET    Pt will be able to  ambulate without an  assistive device safely. GOAL MET    Plan:  Discharge from therapy.    Discharge:    Reason for Discharge: Patient has failed to schedule further appointments.    Equipment Issued: None    Discharge Plan: Patient to continue home program.    Osmin Rice, PT, DPT

## 2018-06-28 ENCOUNTER — TRANSFERRED RECORDS (OUTPATIENT)
Dept: HEALTH INFORMATION MANAGEMENT | Facility: CLINIC | Age: 73
End: 2018-06-28

## 2018-06-29 ENCOUNTER — TRANSFERRED RECORDS (OUTPATIENT)
Dept: HEALTH INFORMATION MANAGEMENT | Facility: CLINIC | Age: 73
End: 2018-06-29

## 2018-07-11 ENCOUNTER — ANESTHESIA EVENT (OUTPATIENT)
Dept: SURGERY | Facility: CLINIC | Age: 73
End: 2018-07-11
Payer: COMMERCIAL

## 2018-07-18 ENCOUNTER — ANESTHESIA (OUTPATIENT)
Dept: SURGERY | Facility: CLINIC | Age: 73
End: 2018-07-18
Payer: COMMERCIAL

## 2018-07-18 ENCOUNTER — SURGERY (OUTPATIENT)
Age: 73
End: 2018-07-18

## 2018-07-18 ENCOUNTER — HOSPITAL ENCOUNTER (OUTPATIENT)
Facility: CLINIC | Age: 73
Discharge: HOME | End: 2018-07-18
Attending: OPHTHALMOLOGY | Admitting: OPHTHALMOLOGY
Payer: COMMERCIAL

## 2018-07-18 VITALS
BODY MASS INDEX: 32.07 KG/M2 | OXYGEN SATURATION: 98 % | SYSTOLIC BLOOD PRESSURE: 152 MMHG | DIASTOLIC BLOOD PRESSURE: 79 MMHG | WEIGHT: 181 LBS | RESPIRATION RATE: 16 BRPM | TEMPERATURE: 98.5 F | HEART RATE: 78 BPM | HEIGHT: 63 IN

## 2018-07-18 DIAGNOSIS — H02.403 PTOSIS OF BOTH EYELIDS: Primary | ICD-10-CM

## 2018-07-18 LAB — GLUCOSE BLDC GLUCOMTR-MCNC: 339 MG/DL (ref 70–99)

## 2018-07-18 PROCEDURE — 27210794 ZZH OR GENERAL SUPPLY STERILE: Performed by: OPHTHALMOLOGY

## 2018-07-18 PROCEDURE — 40000305 ZZH STATISTIC PRE PROC ASSESS I: Performed by: OPHTHALMOLOGY

## 2018-07-18 PROCEDURE — 36000056 ZZH SURGERY LEVEL 3 1ST 30 MIN: Performed by: OPHTHALMOLOGY

## 2018-07-18 PROCEDURE — 37000008 ZZH ANESTHESIA TECHNICAL FEE, 1ST 30 MIN: Performed by: OPHTHALMOLOGY

## 2018-07-18 PROCEDURE — 25000128 H RX IP 250 OP 636: Performed by: NURSE ANESTHETIST, CERTIFIED REGISTERED

## 2018-07-18 PROCEDURE — 25000125 ZZHC RX 250: Performed by: OPHTHALMOLOGY

## 2018-07-18 PROCEDURE — 82962 GLUCOSE BLOOD TEST: CPT

## 2018-07-18 PROCEDURE — 36000058 ZZH SURGERY LEVEL 3 EA 15 ADDTL MIN: Performed by: OPHTHALMOLOGY

## 2018-07-18 PROCEDURE — 25000125 ZZHC RX 250: Performed by: NURSE ANESTHETIST, CERTIFIED REGISTERED

## 2018-07-18 PROCEDURE — 37000009 ZZH ANESTHESIA TECHNICAL FEE, EACH ADDTL 15 MIN: Performed by: OPHTHALMOLOGY

## 2018-07-18 PROCEDURE — 71000027 ZZH RECOVERY PHASE 2 EACH 15 MINS: Performed by: OPHTHALMOLOGY

## 2018-07-18 RX ORDER — DEXAMETHASONE SODIUM PHOSPHATE 4 MG/ML
4 INJECTION, SOLUTION INTRA-ARTICULAR; INTRALESIONAL; INTRAMUSCULAR; INTRAVENOUS; SOFT TISSUE EVERY 10 MIN PRN
Status: DISCONTINUED | OUTPATIENT
Start: 2018-07-18 | End: 2018-07-18 | Stop reason: HOSPADM

## 2018-07-18 RX ORDER — LIDOCAINE 40 MG/G
CREAM TOPICAL
Status: DISCONTINUED | OUTPATIENT
Start: 2018-07-18 | End: 2018-07-18 | Stop reason: HOSPADM

## 2018-07-18 RX ORDER — BUPIVACAINE HYDROCHLORIDE AND EPINEPHRINE 5; 5 MG/ML; UG/ML
INJECTION, SOLUTION PERINEURAL PRN
Status: DISCONTINUED | OUTPATIENT
Start: 2018-07-18 | End: 2018-07-18 | Stop reason: HOSPADM

## 2018-07-18 RX ORDER — ERYTHROMYCIN 5 MG/G
OINTMENT OPHTHALMIC PRN
Status: DISCONTINUED | OUTPATIENT
Start: 2018-07-18 | End: 2018-07-18 | Stop reason: HOSPADM

## 2018-07-18 RX ORDER — TETRACAINE HYDROCHLORIDE 5 MG/ML
SOLUTION OPHTHALMIC PRN
Status: DISCONTINUED | OUTPATIENT
Start: 2018-07-18 | End: 2018-07-18 | Stop reason: HOSPADM

## 2018-07-18 RX ORDER — HYDROCODONE BITARTRATE AND ACETAMINOPHEN 5; 325 MG/1; MG/1
1 TABLET ORAL EVERY 4 HOURS PRN
Qty: 18 TABLET | Refills: 0 | Status: SHIPPED | OUTPATIENT
Start: 2018-07-18

## 2018-07-18 RX ORDER — LOSARTAN POTASSIUM AND HYDROCHLOROTHIAZIDE 25; 100 MG/1; MG/1
1 TABLET ORAL
COMMUNITY
Start: 2018-06-28

## 2018-07-18 RX ORDER — FENTANYL CITRATE 50 UG/ML
25-50 INJECTION, SOLUTION INTRAMUSCULAR; INTRAVENOUS
Status: DISCONTINUED | OUTPATIENT
Start: 2018-07-18 | End: 2018-07-18 | Stop reason: HOSPADM

## 2018-07-18 RX ORDER — ERYTHROMYCIN 5 MG/G
0.5 OINTMENT OPHTHALMIC 3 TIMES DAILY
Qty: 2 TUBE | Refills: 1 | Status: SHIPPED | OUTPATIENT
Start: 2018-07-18

## 2018-07-18 RX ORDER — FENTANYL CITRATE 50 UG/ML
INJECTION, SOLUTION INTRAMUSCULAR; INTRAVENOUS PRN
Status: DISCONTINUED | OUTPATIENT
Start: 2018-07-18 | End: 2018-07-18

## 2018-07-18 RX ORDER — PROPOFOL 10 MG/ML
INJECTION, EMULSION INTRAVENOUS PRN
Status: DISCONTINUED | OUTPATIENT
Start: 2018-07-18 | End: 2018-07-18

## 2018-07-18 RX ORDER — ONDANSETRON 4 MG/1
4 TABLET, ORALLY DISINTEGRATING ORAL EVERY 30 MIN PRN
Status: DISCONTINUED | OUTPATIENT
Start: 2018-07-18 | End: 2018-07-18 | Stop reason: HOSPADM

## 2018-07-18 RX ORDER — NALOXONE HYDROCHLORIDE 0.4 MG/ML
.1-.4 INJECTION, SOLUTION INTRAMUSCULAR; INTRAVENOUS; SUBCUTANEOUS
Status: DISCONTINUED | OUTPATIENT
Start: 2018-07-18 | End: 2018-07-18 | Stop reason: HOSPADM

## 2018-07-18 RX ORDER — SODIUM CHLORIDE, SODIUM LACTATE, POTASSIUM CHLORIDE, CALCIUM CHLORIDE 600; 310; 30; 20 MG/100ML; MG/100ML; MG/100ML; MG/100ML
INJECTION, SOLUTION INTRAVENOUS CONTINUOUS
Status: DISCONTINUED | OUTPATIENT
Start: 2018-07-18 | End: 2018-07-18 | Stop reason: HOSPADM

## 2018-07-18 RX ORDER — ONDANSETRON 2 MG/ML
4 INJECTION INTRAMUSCULAR; INTRAVENOUS EVERY 30 MIN PRN
Status: DISCONTINUED | OUTPATIENT
Start: 2018-07-18 | End: 2018-07-18 | Stop reason: HOSPADM

## 2018-07-18 RX ADMIN — MIDAZOLAM 1 MG: 1 INJECTION INTRAMUSCULAR; INTRAVENOUS at 12:07

## 2018-07-18 RX ADMIN — SODIUM CHLORIDE, POTASSIUM CHLORIDE, SODIUM LACTATE AND CALCIUM CHLORIDE: 600; 310; 30; 20 INJECTION, SOLUTION INTRAVENOUS at 11:02

## 2018-07-18 RX ADMIN — MIDAZOLAM 1 MG: 1 INJECTION INTRAMUSCULAR; INTRAVENOUS at 13:08

## 2018-07-18 RX ADMIN — PROPOFOL 20 MG: 10 INJECTION, EMULSION INTRAVENOUS at 12:10

## 2018-07-18 RX ADMIN — TETRACAINE HYDROCHLORIDE 2 DROP: 5 SOLUTION OPHTHALMIC at 12:32

## 2018-07-18 RX ADMIN — LIDOCAINE HYDROCHLORIDE 1 ML: 10 INJECTION, SOLUTION EPIDURAL; INFILTRATION; INTRACAUDAL; PERINEURAL at 11:02

## 2018-07-18 RX ADMIN — MIDAZOLAM 1 MG: 1 INJECTION INTRAMUSCULAR; INTRAVENOUS at 12:49

## 2018-07-18 RX ADMIN — FENTANYL CITRATE 25 MCG: 50 INJECTION, SOLUTION INTRAMUSCULAR; INTRAVENOUS at 12:42

## 2018-07-18 RX ADMIN — BUPIVACAINE HYDROCHLORIDE AND EPINEPHRINE BITARTRATE 5 ML: 5; .005 INJECTION, SOLUTION PERINEURAL at 12:33

## 2018-07-18 RX ADMIN — ERYTHROMYCIN 1 INCH: 5 OINTMENT OPHTHALMIC at 12:32

## 2018-07-18 RX ADMIN — LIDOCAINE HYDROCHLORIDE AND EPINEPHRINE 5 ML: 20; 10 INJECTION, SOLUTION INFILTRATION; PERINEURAL at 12:33

## 2018-07-18 RX ADMIN — MIDAZOLAM 1 MG: 1 INJECTION INTRAMUSCULAR; INTRAVENOUS at 12:03

## 2018-07-18 RX ADMIN — PROPOFOL 30 MG: 10 INJECTION, EMULSION INTRAVENOUS at 12:09

## 2018-07-18 RX ADMIN — FENTANYL CITRATE 25 MCG: 50 INJECTION, SOLUTION INTRAMUSCULAR; INTRAVENOUS at 12:45

## 2018-07-18 RX ADMIN — FENTANYL CITRATE 50 MCG: 50 INJECTION, SOLUTION INTRAMUSCULAR; INTRAVENOUS at 12:03

## 2018-07-18 NOTE — IP AVS SNAPSHOT
MRN:[id number]                      After Visit Summary   7/18/2018    Frances Cates    MRN: [id number]           Thank you!     Thank you for choosing [facility] for your care. Our goal is always to provide you with excellent care. Hearing back from our patients is one way we can continue to improve our services. Please take a few minutes to complete the written survey that you may receive in the mail after you visit with us. Thank you!        Patient Information     Date Of Birth          1945        About your hospital stay     You were admitted on:  July 18, 2018 You last received care in the:  [facility] PreOP/Phase II    You were discharged on:  July 18, 2018       Who to Call     For medical emergencies, please call 911.  For non-urgent questions about your medical care, please call your primary care provider or clinic, [phone]  For questions related to your surgery, please call your surgery clinic        Attending Provider     Provider Specialty    Keara Bullock MD Ophthalmology       Primary Care Provider Office Phone # Fax #    Megan Atkinson [phone]      Further instructions from your care team                           Same Day Surgery Discharge Instructions  Special Precautions After Surgery - Adult    1. It is not unusual to feel lightheaded or faint, up to 24 hours after surgery or while taking pain medication.  If you have these symptoms; sit for a few minutes before standing and have someone assist you when getting up.  2. You should rest and relax for the next 24 hours and must have someone stay with you for at least 24 hours after your discharge.  3. DO NOT DRIVE any vehicle or operate mechanical equipment for 24 hours following the end of your surgery.  DO NOT DRIVE while taking narcotic pain medications that have been prescribed by your physician.  If you had a limb operated on, you must be able to use it fully to drive.  4. DO  NOT drink alcoholic beverages for 24 hours following surgery or while taking prescription pain medication.  5. Drink clear liquids (apple juice, ginger ale, broth, 7-Up, etc.).  Progress to your regular diet as you feel able.  6. Any questions call your physician and do not make important decisions for 24 hours.    ACTIVITY  ? Sleep in recliner for next several nights or sleep elevated on 2 pillows.  ? No heavy lifting or straining.       INCISIONAL CARE  ? May bathe / shower after 24 hours.  ? Apply ice for 20 minutes every 2 hours for next several days.  ? Be alert for signs of infection:  redness, swelling, heat, drainage of pus, and/or elevated temperature.  Contact your doctor if these occur.        Call for an appointment to return to the clinic as previously scheduled    Medications:  ? Hydrocodone (Norco, Vicodin):  Next dose: ___________.  ? Erythromycin eye ointment as directed.  ? Stool softener to avoid constipation..  ? Follow the instructions on the bottle.    Immediately Following Surgery:  An adult should stay with you for at least the first 24 hours after surgery.   You should rest with your head elevated in a recliner or with at least 2 pillows for the first 48 hours after surgery.   Take the prescribed pain medications before you begin to feel discomfort. It is easier to prevent pain than control it.   Restrict your activities the day of surgery and several days afterwards. It is not unusual to require 7 to 10 days before you are feeling back to normal, and before you can resume strenuous physical activity.   Place ice packs over the surgical site. Refer to the section on swelling for further explanation.   Caution: If you suddenly sit up or stand from a lying position you may become dizzy. If you are lying down following surgery, make sure you sit for one minute before standing. Stand up slowly to provide time to steady yourself. If you feel dizzy when you sit or stand, you should lie back down  immediately to minimize the possibility of fainting.  Bleeding  Mild bleeding from the surgical site is not unusual. Mild pressure will control the bleeding. The main reason bleeding occurs is from patients elevating their blood pressure by bending, lifting, straining, coughing, sneezing, straining on the toilet and other strenuous activities.   To avoid complications from bleeding you must absolutely refrain from activities that may increase blood pressure or bleeding for 10 days after your surgery. Failure to do so may affect your vision! We require our eyelid surgery patients to be  couch potatoes  for the first 48 hours after surgery. We ask you to elevate your head for the first week and simply relax.     Swelling  The swelling that is normally expected is usually proportional to the surgery involved. Swelling around the eyes and down into the cheeks and face is not uncommon. This is the body s normal reaction to surgery and eventual repair. The swelling will start the day of surgery and will not reach its maximum until 2-3 days post-operatively. Occasionally, the eyelids will swell completely shut. The swelling will rapidly decrease after the 3rd day.   The swelling may be minimized by the immediate use of ice packs. Ice decreases swelling and bruising. Ice for 20 minutes every 2 hours at a minimum for at least 3 days.        [facility]   Eyelid/Orbital Surgery Discharge Instructions  Dr. Keara Bullock     ICE COMPRESSES  Immediately following surgery, you should begin to apply ice compresses.  Apply a cold gel pack or wrap a clean washcloth around a cup of crushed ice in a plastic bag (a bag of frozen peas also works well) and hold the cold compresses directly against the closed eyelid (s).Apply cold pack for a minimum of six times daily for no longer than 15 minutes at a time. Continue cold compresses every day until the bruising and swelling begin to subside.  This can vary for each  patient, but three days may be common.    HOT COMPRESSES  After your swelling and bruising have begun to subside, hot compresses should be applied.  Take a clean washcloth and wring it out in hot water (as warm as you can tolerate comfortably).  Hold this warm compress against the closed eyelid(s) at least six times per day for 15 minutes.  This should be continued for about two weeks.    OINTMENT  You may be given some ointment when you leave the hospital.  Apply this ointment as directed per pharmacy label for 7 days.  Expect some blurring of vision from the ointment.     ACTIVITY  Avoid heavy lifting or vigorous exercise for one week after surgery.  You may resume regular activities as tolerated.  You may shower and wash your hair on the day after surgery; be careful to avoid soaking the wounds or getting shampoo in your eyes. While your eyes are still swollen, it is recommended you sleep on your back and elevate your head with 2-3 pillows.    MEDICATION  If the doctor has given you some medications to take after surgery, please take these according to the instructions on the bottle.  Pain medications may make you drowsy so do not drive, operate heavy machinery, or use alcohol while taking it.  When you feel that you do not need the prescription pain medication, you may substitute Extra Strength Tylenol for mild pain by also following the directions on the bottle.    If you were taking Aspirin prior to your surgery, you may resume this medication tomorrow.    If you were on an anticoagulant, you may resume taking it with the next scheduled dose.      WHAT TO EXPECT  You should expect some slight oozing of blood from the incision site over the first two to three days after surgery.  Swelling and bruising will occur for one to two weeks or longer.  You may also experience itching and tearing during the first several weeks after surgery.  This is part of the normal healing process    QUESTIONS  Please feel free to  "contact the office, should you have any questions that are not answered above.  The phone number is [phone].  Please call immediately if you are unable to establish vision in the operative eye, you are experiencing heavy bleeding that will not stop with gentle pressure or you have any signs of an infection (greenish/yellow discharge or progressive redness).    [facility]  [address]  [place] [address]  [phone]             __________________________________________________________________________________________________________________________________  IMPORTANT NUMBERS:    [facility] [place]  [phone], [phone]  Pharmacy:  [phone]  Same Day Surgery:  [phone], Monday - Friday until 8:30 p.m.  Urgent Care:  [phone]  Emergency Room:  [phone]      [facility]:  [phone]                                                                             [facility]: [phone]        Pending Results     No orders found from 7/16/2018 to 7/19/2018.            Admission Information     Date & Time Provider Department Dept. Phone    7/18/2018 Keara Bullock MD [facility] PreOP/Phase -[phone]      Your Vitals Were     Blood Pressure Pulse Temperature Respirations Height Weight    152/79 78 98.5  F (36.9  C) (Oral) 16 1.6 m (5' 3\") 82.1 kg (181 lb)    Pulse Oximetry BMI (Body Mass Index)                98% 32.06 kg/m2          MyChart Information     [facility] lets you send messages to your doctor, view your test results, renew your prescriptions, schedule appointments and more. To sign up, go to [email or web address] . Click on \"Log in\" on the left side of the screen, which will take you to the Welcome page. Then click on \"Sign up Now\" on the right side of the page.     You will be asked to enter the access code listed below, as well as some personal information. Please follow the " directions to create your username and password.     Your access code is: [email or web address]  Expires: 10/16/2018  1:38 PM     Your access code will  in 90 days. If you need help or a new code, please call your [facility] [place] clinic or [phone].        Care EveryWhere ID     This is your Care EveryWhere ID. This could be used by other organizations to access your [facility] medical records  [id number]        Equal Access to Services     ELLIOT OLMSTEAD : Hadii aad ku hadasho Soomaali, waaxda luqadaha, qaybta kaalmada adeegyada, waxay raysain haymargon arian gonzaleztitomanan richardson . So [facility] [phone].    ATENCIÓN: Si habla español, tiene a clancy disposición servicios gratuitos de asistencia lingüística. [facility] [phone].    We comply with applicable federal civil rights laws and [place] laws. We do not discriminate on the basis of race, color, national origin, age, disability, sex, sexual orientation, or gender identity.               Review of your medicines      START taking        Dose / Directions    erythromycin ophthalmic ointment   Commonly known as:  ROMYCIN   Used for:  Ptosis of both eyelids        Dose:  0.5 inch   Apply 0.5 inches topically 3 times daily Apply thin ribbon to upper eyelid and brow incisions (okay to put in eyes) for 1 week   Quantity:  2 Tube   Refills:  1       HYDROcodone-acetaminophen 5-325 MG per tablet   Commonly known as:  NORCO   Used for:  Ptosis of both eyelids        Dose:  1 tablet   Take 1 tablet by mouth every 4 hours as needed for pain   Quantity:  18 tablet   Refills:  0         CONTINUE these medicines which have NOT CHANGED        Dose / Directions    amLODIPine 10 MG tablet   Commonly known as:  NORVASC   Used for:  Essential hypertension, benign        Dose:  10 mg   Take 1 tablet (10 mg) by mouth daily   Quantity:  90 tablet   Refills:  3       aspirin 325 MG EC tablet   Used for:  Status post total right knee replacement        Dose:  325 mg   Take 1 tablet (325 mg) by mouth  daily   Quantity:  42 tablet   Refills:  0       blood glucose monitoring test strip   Commonly known as:  ACCU-CHEK SMARTVIEW   Used for:  Type 2 diabetes mellitus without complication (H)        Use to test blood sugar 2 times daily or as directed.   Quantity:  100 each   Refills:  5       GLIPIZIDE PO        Dose:  10 mg   Take 10 mg by mouth 2 times daily (before meals)   Refills:  0       * losartan-hydrochlorothiazide 100-25 MG per tablet   Commonly known as:  HYZAAR   Used for:  Essential hypertension, benign        Dose:  1 tablet   Take 1 tablet by mouth daily   Quantity:  90 tablet   Refills:  3       * losartan-hydrochlorothiazide 100-25 MG per tablet   Commonly known as:  HYZAAR        Dose:  1 tablet   Take 1 tablet by mouth   Refills:  0       metFORMIN 500 MG 24 hr tablet   Commonly known as:  GLUCOPHAGE-XR   Used for:  Type 2 diabetes mellitus without complication, unspecified long term insulin use status (H)        Dose:  1000 mg   Take 2 tablets (1,000 mg) by mouth 2 times daily (with meals) Labs DUE August 2017   Quantity:  120 tablet   Refills:  0       miconazole 2 % powder   Commonly known as:  MICATIN; MICRO GUARD   Used for:  Fungal rash of trunk        Apply topically 2 times daily Apply to affected area on abdomen twice a day as needed for itching and irritation.   Refills:  0       * order for DME   Used for:  Status post total right knee replacement        Equipment being ordered: CPM   Quantity:  1 Device   Refills:  0       * order for DME        Equipment being ordered: cane single prong   Quantity:  1 Device   Refills:  0       STATIN NOT PRESCRIBED (INTENTIONAL)        Refills:  0       * thin lancets   Commonly known as:  NO BRAND SPECIFIED   Used for:  Type 2 diabetes, HbA1C goal < 8% (H)        Dose:  1 Device   1 Device 4 times daily ,brand per patient insurance insurance coverage.   Quantity:  300 each   Refills:  0       * blood glucose monitoring lancets   Used for:  Type 2  diabetes, HbA1C goal < 8% (H)        Use to test blood sugar 2 times daily or as directed.   Quantity:  1 Box   Refills:  11       * Notice:  This list has 6 medication(s) that are the same as other medications prescribed for you. Read the directions carefully, and ask your doctor or other care provider to review them with you.         Where to get your medicines      Some of these will need a paper prescription and others can be bought over the counter. Ask your nurse if you have questions.     Bring a paper prescription for each of these medications     erythromycin ophthalmic ointment    HYDROcodone-acetaminophen 5-325 MG per tablet                Protect others around you: Learn how to safely use, store and throw away your medicines at www.disposemymeds.org.        Information about OPIOIDS     PRESCRIPTION OPIOIDS: WHAT YOU NEED TO KNOW   We gave you an opioid (narcotic) pain medicine. It is important to manage your pain, but opioids are not always the best choice. You should first try all the other options your care team gave you. Take this medicine for as short a time (and as few doses) as possible.     These medicines have risks:    DO NOT drive when on new or higher doses of pain medicine. These medicines can affect your alertness and reaction times, and you could be arrested for driving under the influence (DUI). If you need to use opioids long-term, talk to your care team about driving.    DO NOT operate heave machinery    DO NOT do any other dangerous activities while taking these medicines.     DO NOT drink any alcohol while taking these medicines.      If the opioid prescribed includes acetaminophen, DO NOT take with any other medicines that contain acetaminophen. Read all labels carefully. Look for the word  acetaminophen  or  Tylenol.  Ask your pharmacist if you have questions or are unsure.    You can get addicted to pain medicines, especially if you have a history of addiction (chemical, alcohol or  substance dependence). Talk to your care team about ways to reduce this risk.    Store your pills in a secure place, locked if possible. We will not replace any lost or stolen medicine. If you don t finish your medicine, please throw away (dispose) as directed by your pharmacist. The [place] [employer] has more information about safe disposal: https://www.pca.[facility].[place].us/living-green/managing-unwanted-medications.     All opioids tend to cause constipation. Drink plenty of water and eat foods that have a lot of fiber, such as fruits, vegetables, prune juice, apple juice and high-fiber cereal. Take a laxative (Miralax, milk of magnesia, Colace, Senna) if you don t move your bowels at least every other day.              Medication List: This is a list of all your medications and when to take them. Check marks below indicate your daily home schedule. Keep this list as a reference.      Medications           Morning Afternoon Evening Bedtime As Needed    amLODIPine 10 MG tablet   Commonly known as:  NORVASC   Take 1 tablet (10 mg) by mouth daily                                aspirin 325 MG EC tablet   Take 1 tablet (325 mg) by mouth daily                                blood glucose monitoring test strip   Commonly known as:  ACCU-CHEK SMARTVIEW   Use to test blood sugar 2 times daily or as directed.                                erythromycin ophthalmic ointment   Commonly known as:  ROMYCIN   Apply 0.5 inches topically 3 times daily Apply thin ribbon to upper eyelid and brow incisions (okay to put in eyes) for 1 week   Last time this was given:  1 inch on 7/18/2018 12:32 PM                                GLIPIZIDE PO   Take 10 mg by mouth 2 times daily (before meals)                                HYDROcodone-acetaminophen 5-325 MG per tablet   Commonly known as:  NORCO   Take 1 tablet by mouth every 4 hours as needed for pain                                * losartan-hydrochlorothiazide 100-25 MG per  tablet   Commonly known as:  HYZAAR   Take 1 tablet by mouth daily                                * losartan-hydrochlorothiazide 100-25 MG per tablet   Commonly known as:  HYZAAR   Take 1 tablet by mouth                                metFORMIN 500 MG 24 hr tablet   Commonly known as:  GLUCOPHAGE-XR   Take 2 tablets (1,000 mg) by mouth 2 times daily (with meals) Labs DUE August 2017                                miconazole 2 % powder   Commonly known as:  MICATIN; MICRO GUARD   Apply topically 2 times daily Apply to affected area on abdomen twice a day as needed for itching and irritation.                                * order for DME   Equipment being ordered: CPM                                * order for DME   Equipment being ordered: cane single prong                                STATIN NOT PRESCRIBED (INTENTIONAL)                                * thin lancets   Commonly known as:  NO BRAND SPECIFIED   1 Device 4 times daily ,brand per patient insurance insurance coverage.                                * blood glucose monitoring lancets   Use to test blood sugar 2 times daily or as directed.                                * Notice:  This list has 6 medication(s) that are the same as other medications prescribed for you. Read the directions carefully, and ask your doctor or other care provider to review them with you.

## 2018-07-18 NOTE — IP AVS SNAPSHOT
[facility] PreOP/Phase II    [address] [address]    Phone:  [phone]    Fax:  [phone]                                       After Visit Summary   7/18/2018    Frances Cates    MRN: [id number]           After Visit Summary Signature Page     I have received my discharge instructions, and my questions have been answered. I have discussed any challenges I see with this plan with the nurse or doctor.    ..........................................................................................................................................  Patient/Patient Representative Signature      ..........................................................................................................................................  Patient Representative Print Name and Relationship to Patient    ..................................................               ................................................  Date                                            Time    ..........................................................................................................................................  Reviewed by Signature/Title    ...................................................              ..............................................  Date                                                            Time

## 2018-07-18 NOTE — ANESTHESIA PREPROCEDURE EVALUATION
Anesthesia Evaluation     . Pt has had prior anesthetic. Type: Regional, General and MAC    No history of anesthetic complications          ROS/MED HX    ENT/Pulmonary:     (+)allergic rhinitis, Intermittent asthma , . .    Neurologic:  - neg neurologic ROS     Cardiovascular:     (+) hypertension----. : . . . :. .       METS/Exercise Tolerance:     Hematologic:  - neg hematologic  ROS       Musculoskeletal: Comment: Displacement cervical intervertebral disc  (+) arthritis, , , -       GI/Hepatic:     (+) GERD Asymptomatic on medication,       Renal/Genitourinary:  - ROS Renal section negative       Endo:     (+) type II DM ( preop; 336 at time of H&P) Last HgA1c: 6.6 date: 11/23/17 Not using insulin .      Psychiatric:  - neg psychiatric ROS       Infectious Disease:  - neg infectious disease ROS       Malignancy:      - no malignancy   Other:                     Physical Exam  Normal systems: cardiovascular and pulmonary    Airway   Mallampati: II  TM distance: >3 FB  Neck ROM: full    Dental     Cardiovascular       Pulmonary                     Anesthesia Plan      History & Physical Review  History and physical reviewed and following examination; no interval change.    ASA Status:  3 .    NPO Status:  > 2 hours (water)    Plan for MAC Reason for MAC:  Procedure to face, neck, head or breast         Postoperative Care  Postoperative pain management:  IV analgesics.      Consents  Anesthetic plan, risks, benefits and alternatives discussed with:  Patient..                          .

## 2018-07-18 NOTE — ANESTHESIA POSTPROCEDURE EVALUATION
Patient: Frances Cates    Procedure(s):  Bilateral Upper Lid Ptosis,Mechanical and Brow Ptosis Repair - Wound Class: I-Clean    Diagnosis:bilateral upper lid ptosis,mechanical and brow ptosis  Diagnosis Additional Information: No value filed.    Anesthesia Type:  No value filed.    Note:  Anesthesia Post Evaluation    Patient location during evaluation: Bedside  Patient participation: Able to fully participate in evaluation  Level of consciousness: awake and alert  Pain management: adequate  Airway patency: patent  Cardiovascular status: acceptable  Respiratory status: acceptable  Hydration status: acceptable  PONV: none     Anesthetic complications: None          Last vitals:  Vitals:    07/18/18 1012 07/18/18 1320 07/18/18 1334   BP: 136/79 148/76 150/80   Pulse:  87 82   Resp: 16 16    Temp: 36.9  C (98.5  F)     SpO2: 97%  98%         Electronically Signed By: LANETTE Richards CRNA  July 18, 2018  1:41 PM

## 2018-07-18 NOTE — ANESTHESIA CARE TRANSFER NOTE
Patient: Frances Cates    Procedure(s):  Bilateral Upper Lid Ptosis,Mechanical and Brow Ptosis Repair - Wound Class: I-Clean    Diagnosis: bilateral upper lid ptosis,mechanical and brow ptosis  Diagnosis Additional Information: No value filed.    Anesthesia Type:   No value filed.     Note:  Airway :Room Air  Patient transferred to:Phase II  Comments: Patient's VSS. Spontaneous respirations. Patient awake and oriented. IV patent. Report to RN.Handoff Report: Identifed the Patient, Identified the Reponsible Provider, Reviewed the pertinent medical history, Discussed the surgical course, Reviewed Intra-OP anesthesia mangement and issues during anesthesia, Set expectations for post-procedure period and Allowed opportunity for questions and acknowledgement of understanding      Vitals: (Last set prior to Anesthesia Care Transfer)    [facility] VITALS  7/18/2018 1244 - 7/18/2018 1314      7/18/2018             Pulse: 57    SpO2: 98 %                Electronically Signed By: LANETTE Richards CRNA  July 18, 2018  1:14 PM

## 2018-07-18 NOTE — ANESTHESIA POSTPROCEDURE EVALUATION
Patient: Frances Cates    Procedure(s):  Bilateral Upper Lid Ptosis,Mechanical and Brow Ptosis Repair - Wound Class: I-Clean    Diagnosis:bilateral upper lid ptosis,mechanical and brow ptosis  Diagnosis Additional Information: No value filed.    Anesthesia Type:  No value filed.    Note:  Anesthesia Post Evaluation    Patient location during evaluation: Bedside  Patient participation: Able to fully participate in evaluation  Level of consciousness: awake and alert  Pain management: adequate  Airway patency: patent  Cardiovascular status: acceptable  Respiratory status: acceptable  Hydration status: acceptable  PONV: none     Anesthetic complications: None          Last vitals:  Vitals:    07/18/18 1012 07/18/18 1320   BP: 136/79 148/76   Pulse:  87   Resp: 16 16   Temp: 36.9  C (98.5  F)    SpO2: 97%          Electronically Signed By: LANETTE Rodrigues CRNA  July 18, 2018  1:30 PM

## 2018-07-18 NOTE — DISCHARGE INSTRUCTIONS
Same Day Surgery Discharge Instructions  Special Precautions After Surgery - Adult    1. It is not unusual to feel lightheaded or faint, up to 24 hours after surgery or while taking pain medication.  If you have these symptoms; sit for a few minutes before standing and have someone assist you when getting up.  2. You should rest and relax for the next 24 hours and must have someone stay with you for at least 24 hours after your discharge.  3. DO NOT DRIVE any vehicle or operate mechanical equipment for 24 hours following the end of your surgery.  DO NOT DRIVE while taking narcotic pain medications that have been prescribed by your physician.  If you had a limb operated on, you must be able to use it fully to drive.  4. DO NOT drink alcoholic beverages for 24 hours following surgery or while taking prescription pain medication.  5. Drink clear liquids (apple juice, ginger ale, broth, 7-Up, etc.).  Progress to your regular diet as you feel able.  6. Any questions call your physician and do not make important decisions for 24 hours.    ACTIVITY  ? Sleep in recliner for next several nights or sleep elevated on 2 pillows.  ? No heavy lifting or straining.       INCISIONAL CARE  ? May bathe / shower after 24 hours.  ? Apply ice for 20 minutes every 2 hours for next several days.  ? Be alert for signs of infection:  redness, swelling, heat, drainage of pus, and/or elevated temperature.  Contact your doctor if these occur.        Call for an appointment to return to the clinic as previously scheduled    Medications:  ? Hydrocodone (Norco, Vicodin):  Next dose: ___________.  ? Erythromycin eye ointment as directed.  ? Stool softener to avoid constipation..  ? Follow the instructions on the bottle.    Immediately Following Surgery:  An adult should stay with you for at least the first 24 hours after surgery.   You should rest with your head elevated in a recliner or with at least 2 pillows for the first 48  hours after surgery.   Take the prescribed pain medications before you begin to feel discomfort. It is easier to prevent pain than control it.   Restrict your activities the day of surgery and several days afterwards. It is not unusual to require 7 to 10 days before you are feeling back to normal, and before you can resume strenuous physical activity.   Place ice packs over the surgical site. Refer to the section on swelling for further explanation.   Caution: If you suddenly sit up or stand from a lying position you may become dizzy. If you are lying down following surgery, make sure you sit for one minute before standing. Stand up slowly to provide time to steady yourself. If you feel dizzy when you sit or stand, you should lie back down immediately to minimize the possibility of fainting.  Bleeding  Mild bleeding from the surgical site is not unusual. Mild pressure will control the bleeding. The main reason bleeding occurs is from patients elevating their blood pressure by bending, lifting, straining, coughing, sneezing, straining on the toilet and other strenuous activities.   To avoid complications from bleeding you must absolutely refrain from activities that may increase blood pressure or bleeding for 10 days after your surgery. Failure to do so may affect your vision! We require our eyelid surgery patients to be  couch potatoes  for the first 48 hours after surgery. We ask you to elevate your head for the first week and simply relax.     Swelling  The swelling that is normally expected is usually proportional to the surgery involved. Swelling around the eyes and down into the cheeks and face is not uncommon. This is the body s normal reaction to surgery and eventual repair. The swelling will start the day of surgery and will not reach its maximum until 2-3 days post-operatively. Occasionally, the eyelids will swell completely shut. The swelling will rapidly decrease after the 3rd day.   The swelling may be  minimized by the immediate use of ice packs. Ice decreases swelling and bruising. Ice for 20 minutes every 2 hours at a minimum for at least 3 days.        [facility]   Eyelid/Orbital Surgery Discharge Instructions  Dr. Keara Bullock     ICE COMPRESSES  Immediately following surgery, you should begin to apply ice compresses.  Apply a cold gel pack or wrap a clean washcloth around a cup of crushed ice in a plastic bag (a bag of frozen peas also works well) and hold the cold compresses directly against the closed eyelid (s).Apply cold pack for a minimum of six times daily for no longer than 15 minutes at a time. Continue cold compresses every day until the bruising and swelling begin to subside.  This can vary for each patient, but three days may be common.    HOT COMPRESSES  After your swelling and bruising have begun to subside, hot compresses should be applied.  Take a clean washcloth and wring it out in hot water (as warm as you can tolerate comfortably).  Hold this warm compress against the closed eyelid(s) at least six times per day for 15 minutes.  This should be continued for about two weeks.    OINTMENT  You may be given some ointment when you leave the hospital.  Apply this ointment as directed per pharmacy label for 7 days.  Expect some blurring of vision from the ointment.     ACTIVITY  Avoid heavy lifting or vigorous exercise for one week after surgery.  You may resume regular activities as tolerated.  You may shower and wash your hair on the day after surgery; be careful to avoid soaking the wounds or getting shampoo in your eyes. While your eyes are still swollen, it is recommended you sleep on your back and elevate your head with 2-3 pillows.    MEDICATION  If the doctor has given you some medications to take after surgery, please take these according to the instructions on the bottle.  Pain medications may make you drowsy so do not drive, operate heavy machinery, or use alcohol while  taking it.  When you feel that you do not need the prescription pain medication, you may substitute Extra Strength Tylenol for mild pain by also following the directions on the bottle.    If you were taking Aspirin prior to your surgery, you may resume this medication tomorrow.    If you were on an anticoagulant, you may resume taking it with the next scheduled dose.      WHAT TO EXPECT  You should expect some slight oozing of blood from the incision site over the first two to three days after surgery.  Swelling and bruising will occur for one to two weeks or longer.  You may also experience itching and tearing during the first several weeks after surgery.  This is part of the normal healing process    QUESTIONS  Please feel free to contact the office, should you have any questions that are not answered above.  The phone number is [phone].  Please call immediately if you are unable to establish vision in the operative eye, you are experiencing heavy bleeding that will not stop with gentle pressure or you have any signs of an infection (greenish/yellow discharge or progressive redness).    [facility]  [address]  [place] [address]  [phone]             __________________________________________________________________________________________________________________________________  IMPORTANT NUMBERS:    [facility] [place] Number:  [phone], [phone]  Pharmacy:  [phone]  Same Day Surgery:  [phone], Monday - Friday until 8:30 p.m.  Urgent Care:  [phone]  Emergency Room:  [phone]      [facility]:  [phone]                                                                             [facility]: [phone]

## 2019-11-07 ENCOUNTER — ALLIED HEALTH/NURSE VISIT (OUTPATIENT)
Dept: NURSING | Facility: CLINIC | Age: 74
End: 2019-11-07
Payer: MEDICARE

## 2019-11-07 DIAGNOSIS — Z23 NEED FOR VACCINATION: Primary | ICD-10-CM

## 2019-11-07 PROCEDURE — 90471 IMMUNIZATION ADMIN: CPT | Performed by: FAMILY MEDICINE

## 2019-11-07 PROCEDURE — 90750 HZV VACC RECOMBINANT IM: CPT | Performed by: FAMILY MEDICINE

## 2019-11-07 PROCEDURE — 99207 ZZC NO CHARGE LOS: CPT | Performed by: FAMILY MEDICINE

## 2019-11-07 NOTE — PROGRESS NOTES
Prior to immunization administration, verified patients identity using patient s name and date of birth. Please see Immunization Activity for additional information.     Screening Questionnaire for Adult Immunization    Are you sick today?   No   Do you have allergies to medications, food, a vaccine component or latex?   No   Have you ever had a serious reaction after receiving a vaccination?   No   Do you have a long-term health problem with heart disease, lung disease, asthma, kidney disease, metabolic disease (e.g. diabetes), anemia, or other blood disorder?   No   Do you have cancer, leukemia, HIV/AIDS, or any other immune system problem?   No   In the past 3 months, have you taken medications that affect  your immune system, such as prednisone, other steroids, or anticancer drugs; drugs for the treatment of rheumatoid arthritis, Crohn s disease, or psoriasis; or have you had radiation treatments?   No   Have you had a seizure, or a brain or other nervous system problem?   No   During the past year, have you received a transfusion of blood or blood     products, or been given immune (gamma) globulin or antiviral drug?   No   For women: Are you pregnant or is there a chance you could become        pregnant during the next month?   No   Have you received any vaccinations in the past 4 weeks?   No     Immunization questionnaire answers were all negative.        Per orders of Dr. Posada, injection of Shingrix given by Samia Mike CMA. Patient instructed to remain in clinic for 15 minutes afterwards, and to report any adverse reaction to me immediately.       Screening performed by Samia Mike CMA on 11/7/2019 at 1:04 PM.

## 2019-12-05 NOTE — PROGRESS NOTES
Pt A/Ox3, SBA/Walker, knee pain controlled with po pain meds and ice, ambulated in halls with walker and staff x2   up in chair until 2200  Pt voiding, tolerating diet, had BM and make needs known, plan to DC home today continue to monitor and follow POC   Airway patent, Nasal mucosa clear. Mouth with normal mucosa. Throat has no vesicles, no oropharyngeal exudates and uvula is midline.

## 2020-01-20 ENCOUNTER — TRANSFERRED RECORDS (OUTPATIENT)
Dept: HEALTH INFORMATION MANAGEMENT | Facility: CLINIC | Age: 75
End: 2020-01-20

## 2020-01-20 LAB — HBA1C MFR BLD: 10.9 % (ref 4.8–6)

## 2020-06-22 ENCOUNTER — TRANSFERRED RECORDS (OUTPATIENT)
Dept: HEALTH INFORMATION MANAGEMENT | Facility: CLINIC | Age: 75
End: 2020-06-22

## 2020-06-22 LAB — HBA1C MFR BLD: 12 % (ref 4.8–6)

## 2020-06-30 ENCOUNTER — MEDICAL CORRESPONDENCE (OUTPATIENT)
Dept: HEALTH INFORMATION MANAGEMENT | Facility: CLINIC | Age: 75
End: 2020-06-30

## 2024-05-30 NOTE — OP NOTE
"Pre-operative Diagnosis:   Visually significant bilateral upper eyelid ptosis, dermatochalasis causing visually significant mechanical ptosis  Bilateral brow ptosis also causing visually significant mechanical ptosis      Post-operative Diagnosis: Same as above      Procedure(s):   1. Bilateral upper eyelid ptosis repair with blepharoplasty   2. Bilateral direct browplasty (brow ptosis repair)       Surgeon: Keara Bullock MD      Anesthesia: Monitored anesthesia care with local anesthetic      Blood loss: <5 cc      Specimens: None      Complications: None      Operative Procedure:  In the pre operative area, the planned procedure was again discussed with the patient as well as the risks/benefits/alternatives. The patient was brought to the operating room.  A \"time out\" was performed to ensure the correct surgical site was being operated on.  Topical anesthesia was placed in both eyes.  The peak of the brow was marked. Browplasty incisions were marked with care taken to hide the incision lines in the patient's natural creases.  The eyelid skin was cleaned with isopropyl alcohol. The upper eyelid creases and blepharoplasty incisions were marked with care taken to avoid post operative lagophthalmos; the patient's natural crease and \"laugh lines\" were used. Local anesthetic was injected.  The patient was prepped and draped in the usual sterile fashion.      The skin was incised along the brow markings.  Tissue was excised in a subcutaneous plane.  Hemostasis was achieved.  The deep tissue was closed, with deep, interrupted, buried 5-0 vicryl and the skin was closed with 50 fast absorbing gut sutures with care taken to jose the edges.       Next, attention was directed to the upper eyelids.       The skin was incised along the eyelid crease and the superior margin as outlined.  A skin flap was created with the dissection plane between the skin and the orbicularis muscle. Hemostasis was achieved with cautery. This " was performed bilaterally.        Attention was then directed to the internal ptosis repair.  A 4-0 silk suture was placed through the gray line of the right upper eyelid.  The eyelid was everted over a Beau Diaz retractor.  Two markings were placed at the medial and lateral 1/3 of the tarsal plate.  A caliper was placed (and confirmed with a ruler) to 4.0 mm and two additional marks were placed 4.0 mm superior to the tarsal plate.  The conjunctiva was lifted with two toothed forceps, and the Putterman clamp was placed and clamped incorporating 8.0 mm of conjunctiva/Suarez's complex.  A 6-0 chromic suture was passed from lateral to medial and back lateral under the Putterman clamp.  Next, a #15 blade was used to cut the conjunctiva from the clamp in a metal on metal fashion.  The sutures were confirmed to be intact and then were tied. The same procedure, for a total resection of 8.0 mm, was performed on the other eyelid.      The skin was closed with 6-0 fast absorbing gut suture in an interrupted and running fashion.          The eyelids were washed with wet 4x4 gauze. Antibiotic ointment was placed on the operative site(s).  The patient was transferred to recovery in stable position.  Ice packs were placed. The patient will return for a post-operative follow up appointment in 1 week, sooner with any increase in pain, bleeding or redness or decrease in vision.      Keara Bullock MD   There are no Wet Read(s) to document.

## (undated) DEVICE — LIGHT HANDLE X2

## (undated) DEVICE — BLADE SAW RECIP STRK LONG 70X12.5X0.9MM 0277-096-278

## (undated) DEVICE — DRSG AQUACEL AG 3.5X9.75" HYDROFIBER 412011

## (undated) DEVICE — GLOVE PROTEXIS BLUE W/NEU-THERA 7.5  2D73EB75

## (undated) DEVICE — SOL NACL 0.9% IRRIG 3000ML BAG 07972-08

## (undated) DEVICE — BONE CEMENT MIXEVAC III HI VAC KIT  0206-015-000

## (undated) DEVICE — GOWN IMPERVIOUS SPECIALTY XLG/XLONG 32474

## (undated) DEVICE — TAPE TRANSPORE 1"

## (undated) DEVICE — SU SILK 4-0 PS-2 18" 1677G

## (undated) DEVICE — CAST PADDING 6" UNSTERILE 9046

## (undated) DEVICE — SU VICRYL 1 CT-1 36" UND J947H

## (undated) DEVICE — SYR BULB IRRIG 50ML LATEX FREE 0035280

## (undated) DEVICE — SU PLAIN FAST ABSORB 5-0 PC-1 18" 1915G

## (undated) DEVICE — CATH TRAY FOLEY 16FR SIL

## (undated) DEVICE — DRSG GAUZE 4X4" 3033

## (undated) DEVICE — Device

## (undated) DEVICE — SU VICRYL 2-0 CT-1 36" UND J945H

## (undated) DEVICE — SU CHROMIC 6-0 G-1 18" 790G

## (undated) DEVICE — SPONGE LAP 18X18" X8435

## (undated) DEVICE — GOWN XLG DISP 9545

## (undated) DEVICE — SOL NACL 0.9% IRRIG 1000ML BOTTLE 07138-09

## (undated) DEVICE — SU PLAIN FAST ABSORB 6-0 PC-1 18" 1916G

## (undated) DEVICE — BNDG FLEXIGRIP F 11YD ROLL UNSTERILE LATEX FREE 0814-9095

## (undated) DEVICE — SU MONOCRYL 3-0 PS-2 18" UND Y497G

## (undated) DEVICE — SOL WATER IRRIG 1000ML BOTTLE 07139-09

## (undated) DEVICE — SPONGE SPEAR WECK CEL 6/PKG 0008680

## (undated) DEVICE — BONE CLEANING TIP INTERPULSE  0210-010-000

## (undated) DEVICE — DRAPE INCISE 51X51" 1060

## (undated) DEVICE — GLOVE PROTEXIS MICRO 7.5  2D73PM75

## (undated) DEVICE — BLADE SAW SAGITTAL STRK 21X90X1.19MM HD SYS 6 6221-119-090

## (undated) DEVICE — PACK SET-UP STD 9102

## (undated) DEVICE — SYR 03ML 22GA 1.5" ECLIPSE

## (undated) DEVICE — BNDG COBAN 6"X5YDS STERILE

## (undated) DEVICE — BLADE KNIFE BEAVER 376400

## (undated) DEVICE — APPLICATORS COTTON TIP 6" X2

## (undated) DEVICE — GLOVE PROTEXIS W/NEU-THERA 6.5  2D73TE65

## (undated) DEVICE — BLADE SAW OSCIL/SAG STRK 11X90X1.19MM 4/2000 4111-119-090

## (undated) DEVICE — DRAPE SHEET REV FOLD 3/4 9349

## (undated) DEVICE — SU ETHIBOND 1 OS-6 36" X538

## (undated) DEVICE — SUCTION IRR SYSTEM W/TIP INTERPULSE

## (undated) DEVICE — SU VICRYL 4-0 P-3 18" UND J494G

## (undated) DEVICE — GLOVE PROTEXIS BLUE W/NEU-THERA 6.5  2D73EB65

## (undated) DEVICE — SU VICRYL 1 CTB-1 36" UND JB947

## (undated) DEVICE — PEN MARKING SKIN W/LABELS 31145884

## (undated) DEVICE — GOWN LG DISP 9515

## (undated) DEVICE — SPONGE RAY-TEC 4X8" 7318

## (undated) DEVICE — GLOVE PROTEXIS W/NEU-THERA 8.0  2D73TE80

## (undated) DEVICE — BONE CEMENT KIT BOWL AND SPATULA STRK 6201-3-410

## (undated) DEVICE — SYR 10ML LL W/O NDL

## (undated) DEVICE — NDL COUNTER 20CT 31142493

## (undated) DEVICE — PREP CHLORAPREP 26ML TINTED ORANGE  260815

## (undated) DEVICE — NDL 27GA 1.25" 305136

## (undated) DEVICE — SU PDO 1 STRATAFIX 36X36CM CTX TAPERPOINT SXPD2B405

## (undated) RX ORDER — PROPOFOL 10 MG/ML
INJECTION, EMULSION INTRAVENOUS
Status: DISPENSED
Start: 2018-07-18

## (undated) RX ORDER — ERYTHROMYCIN 5 MG/G
OINTMENT OPHTHALMIC
Status: DISPENSED
Start: 2018-07-18

## (undated) RX ORDER — FENTANYL CITRATE 50 UG/ML
INJECTION, SOLUTION INTRAMUSCULAR; INTRAVENOUS
Status: DISPENSED
Start: 2018-07-18

## (undated) RX ORDER — PROPOFOL 10 MG/ML
INJECTION, EMULSION INTRAVENOUS
Status: DISPENSED
Start: 2017-11-22

## (undated) RX ORDER — LIDOCAINE HYDROCHLORIDE 10 MG/ML
INJECTION, SOLUTION EPIDURAL; INFILTRATION; INTRACAUDAL; PERINEURAL
Status: DISPENSED
Start: 2017-11-22

## (undated) RX ORDER — ONDANSETRON 2 MG/ML
INJECTION INTRAMUSCULAR; INTRAVENOUS
Status: DISPENSED
Start: 2017-11-22

## (undated) RX ORDER — FENTANYL CITRATE 50 UG/ML
INJECTION, SOLUTION INTRAMUSCULAR; INTRAVENOUS
Status: DISPENSED
Start: 2017-11-22

## (undated) RX ORDER — TETRACAINE HYDROCHLORIDE 5 MG/ML
SOLUTION OPHTHALMIC
Status: DISPENSED
Start: 2018-07-18

## (undated) RX ORDER — DEXAMETHASONE SODIUM PHOSPHATE 4 MG/ML
INJECTION, SOLUTION INTRA-ARTICULAR; INTRALESIONAL; INTRAMUSCULAR; INTRAVENOUS; SOFT TISSUE
Status: DISPENSED
Start: 2017-11-22

## (undated) RX ORDER — ROPIVACAINE HYDROCHLORIDE 5 MG/ML
INJECTION, SOLUTION EPIDURAL; INFILTRATION; PERINEURAL
Status: DISPENSED
Start: 2017-11-22

## (undated) RX ORDER — BUPIVACAINE HYDROCHLORIDE AND EPINEPHRINE 5; 5 MG/ML; UG/ML
INJECTION, SOLUTION EPIDURAL; INTRACAUDAL; PERINEURAL
Status: DISPENSED
Start: 2018-07-18

## (undated) RX ORDER — LIDOCAINE HCL/EPINEPHRINE/PF 2%-1:200K
VIAL (ML) INJECTION
Status: DISPENSED
Start: 2018-07-18